# Patient Record
Sex: MALE | Race: WHITE | NOT HISPANIC OR LATINO | Employment: FULL TIME | URBAN - METROPOLITAN AREA
[De-identification: names, ages, dates, MRNs, and addresses within clinical notes are randomized per-mention and may not be internally consistent; named-entity substitution may affect disease eponyms.]

---

## 2018-01-03 ENCOUNTER — OFFICE VISIT (OUTPATIENT)
Dept: OPHTHALMOLOGY | Facility: CLINIC | Age: 38
End: 2018-01-03
Attending: OPHTHALMOLOGY
Payer: COMMERCIAL

## 2018-01-03 DIAGNOSIS — G36.0 NEUROMYELITIS OPTICA (H): ICD-10-CM

## 2018-01-03 DIAGNOSIS — H46.9 OPTIC NEURITIS, RIGHT: ICD-10-CM

## 2018-01-03 DIAGNOSIS — H53.10 SUBJECTIVE VISION DISTURBANCE: Primary | ICD-10-CM

## 2018-01-03 PROCEDURE — G0463 HOSPITAL OUTPT CLINIC VISIT: HCPCS | Mod: ZF

## 2018-01-03 PROCEDURE — 92133 CPTRZD OPH DX IMG PST SGM ON: CPT | Mod: ZF | Performed by: OPHTHALMOLOGY

## 2018-01-03 PROCEDURE — 92083 EXTENDED VISUAL FIELD XM: CPT | Mod: ZF | Performed by: OPHTHALMOLOGY

## 2018-01-03 RX ORDER — SILDENAFIL 50 MG/1
TABLET, FILM COATED ORAL
COMMUNITY
Start: 2017-05-02

## 2018-01-03 RX ORDER — AZATHIOPRINE 50 MG/1
TABLET ORAL
COMMUNITY
Start: 2017-07-24 | End: 2023-11-29

## 2018-01-03 RX ORDER — TADALAFIL 20 MG/1
20 TABLET ORAL
COMMUNITY
Start: 2016-05-27 | End: 2023-11-29

## 2018-01-03 ASSESSMENT — CUP TO DISC RATIO
OS_RATIO: 0.2
OD_RATIO: 0.2

## 2018-01-03 ASSESSMENT — EXTERNAL EXAM - RIGHT EYE: OD_EXAM: NORMAL

## 2018-01-03 ASSESSMENT — REFRACTION_WEARINGRX
OD_CYLINDER: +1.00
OS_CYLINDER: +2.25
OS_SPHERE: -3.00
OD_AXIS: 055
OD_SPHERE: -2.00
OS_AXIS: 105

## 2018-01-03 ASSESSMENT — SLIT LAMP EXAM - LIDS
COMMENTS: NORMAL
COMMENTS: NORMAL

## 2018-01-03 ASSESSMENT — CONF VISUAL FIELD
OD_NORMAL: 1
METHOD: COUNTING FINGERS
OS_NORMAL: 1

## 2018-01-03 ASSESSMENT — TONOMETRY
OD_IOP_MMHG: 8
IOP_METHOD: TONOPEN
OS_IOP_MMHG: 8

## 2018-01-03 ASSESSMENT — VISUAL ACUITY
OS_CC: 20/15
METHOD: SNELLEN - LINEAR
OD_CC: 20/15

## 2018-01-03 ASSESSMENT — EXTERNAL EXAM - LEFT EYE: OS_EXAM: NORMAL

## 2018-01-03 NOTE — PROGRESS NOTES
HPI  Compa Pennington is a 37 year old male with history of neuromyelitis optica  Status-post multiple attacks of optic  Neuritis RIGHT eye.  Last attack was November 2014.  Patient reports noticing a change in his vision about a week ago. He feels a pain in his right eye in lateral gaze that started about 2 weeks ago. He feels like there is some associated blurry vision with the pain. He feels that the eye pain has since improved. On azathioprine for NMO and bactrim for ppx. Has not had MRI in 2+ years. Has not seen neuro in 1.5 years. No autonomic signs, urinary, bowel complaints.       Assessment & Plan      (H53.10) Optic neuritis, NMO, right eye   Comment: Visual acuity at baseline, worse MD on visual field but no specific pattern. OCT retinal nerve fiber layer is atrophic as previous. Patient had pain with eye movements and reports vision changes. Concern that this is recurrence.   Plan: MRI brain/orbits. Unable to schedule MRI spine, will defer for now  Discuss if worsening, would start steroids.   Continue azathioprine.    -----------------------------------------------------------------------------------    Patient disposition:   Return in about 2 weeks (around 1/17/2018) for Follow Up with Neurop - Mian. or sooner as needed.    Teaching statement:  Complete documentation of historical and exam elements from today's encounter can be found in the full encounter summary report (not reduplicated in this progress note). I personally obtained the chief complaint(s) and history of present illness.  I confirmed and edited as necessary the review of systems, past medical/surgical history, family history, social history, and examination findings as documented by others; and I examined the patient myself. I personally reviewed the relevant tests, images, and reports as documented above.     I formulated and edited as necessary the assessment and plan and discussed the findings and management plan with the patient  and family.    Karuna Jefferson MD  Comprehensive Ophthalmology & Ocular Pathology  Department of Ophthalmology and Visual Neurosciences  [email or web address]  Pager [phone]

## 2018-01-03 NOTE — MR AVS SNAPSHOT
After Visit Summary   1/3/2018    Compa Pennington    MRN: [id number]           Patient Information     Date Of Birth          1980        Visit Information        Provider Department      1/3/2018 1:00 PM Jose Papaps MD [facility]        Today's Diagnoses     Subjective vision disturbance    -  1    Optic neuritis, right        Neuromyelitis optica (H)           Follow-ups after your visit        Follow-up notes from your care team     Return in about 6 months (around 7/3/2018) for Follow Up.      Your next 10 appointments already scheduled     Jan 04, 2018  3:00 PM CST   (Arrive by 2:45 PM)   MR BRAIN AND ORBITS with UUMR2   [facility], [place], MRI ([facility], [facility])    [address] [facility] [address] [phone]           Take your medicines as usual, unless your doctor tells you not to. Bring a list of your current medicines to your exam (including vitamins, minerals and over-the-counter drugs). Also bring the results of similar scans you may have had.  Please remove any body piercings and hair extensions before you arrive.  Follow your doctor s orders. If you do not, we may have to postpone your exam.  You will not have contrast for this exam. You do not need to do anything special to prepare.  The MRI machine uses a strong magnet. Please wear clothes without metal (snaps, zippers). A sweatsuit works well, or we may give you a hospital gown.   **IMPORTANT** THE INSTRUCTIONS BELOW ARE ONLY FOR THOSE PATIENTS WHO HAVE BEEN TOLD THEY WILL RECEIVE SEDATION OR GENERAL ANESTHESIA DURING THEIR MRI PROCEDURE:  IF YOU WILL RECEIVE SEDATION (take medicine to help you relax during your exam):   You must get the medicine from your doctor before you arrive. Bring the medicine to the exam. Do not take it at home.   Arrive one hour early. Bring someone who can take you home after the test. Your medicine will make you sleepy. After the exam, you may  not drive, take a bus or take a taxi by yourself.   No eating 8 hours before your exam. You may have clear liquids up until 4 hours before your exam. (Clear liquids include water, clear tea, black coffee and fruit juice without pulp.)  IF YOU WILL RECEIVE ANESTHESIA (be asleep for your exam):   Arrive 1 1/2 hours early. Bring someone who can take you home after the test. You may not drive, take a bus or take a taxi by yourself.   No eating 8 hours before your exam. You may have clear liquids up until 4 hours before your exam. (Clear liquids include water, clear tea, black coffee and fruit juice without pulp.)   You will spend four to five hours in the recovery room.  Please call the Imaging Department at your exam site with any questions.            Jan 11, 2018  5:30 PM CST   (Arrive by 5:15 PM)   MR CERVICAL SPINE W/O & W CONTRAST with [address] MRI ([facility])    [address] [address] [phone]           Take your medicines as usual, unless your doctor tells you not to. Bring a list of your current medicines to your exam (including vitamins, minerals and over-the-counter drugs).  You will be given intravenous contrast for this exam. To prepare:   The day before your exam, drink extra fluids at least six 8-ounce glasses (unless your doctor tells you to restrict your fluids).   Have a blood test (creatinine test) within 30 days of your exam. Go to your clinic or Diagnostic Imaging Department for this test.  The MRI machine uses a strong magnet. Please wear clothes without metal (snaps, zippers). A sweatsuit works well, or we may give you a hospital gown.  Please remove any body piercings and hair extensions before you arrive. You will also remove watches, jewelry, hairpins, wallets, dentures, partial dental plates and hearing aids. You may wear contact lenses, and you may be able to wear your rings. We have a safe place to  keep your personal items, but it is safer to leave them at home.   **IMPORTANT** THE INSTRUCTIONS BELOW ARE ONLY FOR THOSE PATIENTS WHO HAVE BEEN TOLD THEY WILL RECEIVE SEDATION OR GENERAL ANESTHESIA DURING THEIR MRI PROCEDURE:  IF YOU WILL RECEIVE SEDATION (take medicine to help you relax during your exam):   You must get the medicine from your doctor before you arrive. Bring the medicine to the exam. Do not take it at home.   Arrive one hour early. Bring someone who can take you home after the test. Your medicine will make you sleepy. After the exam, you may not drive, take a bus or take a taxi by yourself.   No eating 8 hours before your exam. You may have clear liquids up until 4 hours before your exam. (Clear liquids include water, clear tea, black coffee and fruit juice without pulp.)  IF YOU WILL RECEIVE ANESTHESIA (be asleep for your exam):   Arrive 1 1/2 hours early. Bring someone who can take you home after the test. You may not drive, take a bus or take a taxi by yourself.   No eating 8 hours before your exam. You may have clear liquids up until 4 hours before your exam. (Clear liquids include water, clear tea, black coffee and fruit juice without pulp.)  Please call the Imaging Department at your exam site with any questions.            Jan 11, 2018  6:15 PM CST   (Arrive by 6:00 PM)   MR ORBIT W/O & W CONTRAST with [address] [facility] MRI ([facility])    [address] [address] [phone]           Take your medicines as usual, unless your doctor tells you not to. Bring a list of your current medicines to your exam (including vitamins, minerals and over-the-counter drugs).  You will be given intravenous contrast for this exam. To prepare:   The day before your exam, drink extra fluids at least six 8-ounce glasses (unless your doctor tells you to restrict your fluids).   Have a blood test (creatinine test) within 30 days of your  exam. Go to your clinic or Diagnostic Imaging Department for this test.  The MRI machine uses a strong magnet. Please wear clothes without metal (snaps, zippers). A sweatsuit works well, or we may give you a hospital gown.  Please remove any body piercings and hair extensions before you arrive. You will also remove watches, jewelry, hairpins, wallets, dentures, partial dental plates and hearing aids. You may wear contact lenses, and you may be able to wear your rings. We have a safe place to keep your personal items, but it is safer to leave them at home.   **IMPORTANT** THE INSTRUCTIONS BELOW ARE ONLY FOR THOSE PATIENTS WHO HAVE BEEN TOLD THEY WILL RECEIVE SEDATION OR GENERAL ANESTHESIA DURING THEIR MRI PROCEDURE:  IF YOU WILL RECEIVE SEDATION (take medicine to help you relax during your exam):   You must get the medicine from your doctor before you arrive. Bring the medicine to the exam. Do not take it at home.   Arrive one hour early. Bring someone who can take you home after the test. Your medicine will make you sleepy. After the exam, you may not drive, take a bus or take a taxi by yourself.   No eating 8 hours before your exam. You may have clear liquids up until 4 hours before your exam. (Clear liquids include water, clear tea, black coffee and fruit juice without pulp.)  IF YOU WILL RECEIVE ANESTHESIA (be asleep for your exam):   Arrive 1 1/2 hours early. Bring someone who can take you home after the test. You may not drive, take a bus or take a taxi by yourself.   No eating 8 hours before your exam. You may have clear liquids up until 4 hours before your exam. (Clear liquids include water, clear tea, black coffee and fruit juice without pulp.)  Please call the Imaging Department at your exam site with any questions.            Jan 11, 2018  7:00 PM CST   (Arrive by 6:45 PM)   MR BRAIN W/O & W CONTRAST with [address] [facility] ([facility])    [address]  [facility] [address] [phone]           Take your medicines as usual, unless your doctor tells you not to. Bring a list of your current medicines to your exam (including vitamins, minerals and over-the-counter drugs).  You will be given intravenous contrast for this exam. To prepare:   The day before your exam, drink extra fluids at least six 8-ounce glasses (unless your doctor tells you to restrict your fluids).   Have a blood test (creatinine test) within 30 days of your exam. Go to your clinic or Diagnostic Imaging Department for this test.  The MRI machine uses a strong magnet. Please wear clothes without metal (snaps, zippers). A sweatsuit works well, or we may give you a hospital gown.  Please remove any body piercings and hair extensions before you arrive. You will also remove watches, jewelry, hairpins, wallets, dentures, partial dental plates and hearing aids. You may wear contact lenses, and you may be able to wear your rings. We have a safe place to keep your personal items, but it is safer to leave them at home.   **IMPORTANT** THE INSTRUCTIONS BELOW ARE ONLY FOR THOSE PATIENTS WHO HAVE BEEN TOLD THEY WILL RECEIVE SEDATION OR GENERAL ANESTHESIA DURING THEIR MRI PROCEDURE:  IF YOU WILL RECEIVE SEDATION (take medicine to help you relax during your exam):   You must get the medicine from your doctor before you arrive. Bring the medicine to the exam. Do not take it at home.   Arrive one hour early. Bring someone who can take you home after the test. Your medicine will make you sleepy. After the exam, you may not drive, take a bus or take a taxi by yourself.   No eating 8 hours before your exam. You may have clear liquids up until 4 hours before your exam. (Clear liquids include water, clear tea, black coffee and fruit juice without pulp.)  IF YOU WILL RECEIVE ANESTHESIA (be asleep for your exam):   Arrive 1 1/2 hours early. Bring someone who can take you home after the test. You may not  drive, take a bus or take a taxi by yourself.   No eating 8 hours before your exam. You may have clear liquids up until 4 hours before your exam. (Clear liquids include water, clear tea, black coffee and fruit juice without pulp.)  Please call the Imaging Department at your exam site with any questions.              Future tests that were ordered for you today     Open Future Orders        Priority Expected Expires Ordered    MR Brain and Orbits Routine  1/3/2019 1/3/2018            Who to contact     Please call your clinic at [phone] to:    Ask questions about your health    Make or cancel appointments    Discuss your medicines    Learn about your test results    Speak to your doctor   If you have compliments or concerns about an experience at your clinic, or if you wish to file a complaint, please contact [facility] Patient Relations at [phone] or email us at [email or web address]         Additional Information About Your Visit        A V.E[employer]T.S[employer]c.a.r.e[employer]har[employer] Television Information     [employer] is an electronic gateway that provides easy, online access to your medical records. With [employer], you can request a clinic appointment, read your test results, renew a prescription or communicate with your care team.     To sign up for [employer] visit the website at [email or web address]   You will be asked to enter the access code listed below, as well as some personal information. Please follow the directions to create your username and password.     Your access code is: [email or web address]  Expires: 3/29/2018  6:31 AM     Your access code will  in 90 days. If you need help or a new code, please contact your [facility] or call [phone] for assistance.        Care EveryWhere ID     This is your Care EveryWhere ID. This could be used by other organizations to access your [place] medical records  [id number]         Blood Pressure from Last 3 Encounters:   14  (!) 148/105   11/29/14 (!) 144/106   11/28/14 135/83    Weight from Last 3 Encounters:   11/28/14 96.6 kg (213 lb)              We Performed the Following     Glaucoma Top OU     OCT Optic Nerve RNFL Spectralis OU (both eyes)        Primary Care Provider Office Phone # Fax #    Mayra Barclay [phone]       XX RESIGNED XX  [place] [address]        Equal Access to Services     [facility] : Hadii aad ku hadasho Soomaali, waaxda luqadaha, qaybta kaalmada adeegyada, waxay idiin hayaan adeeg khlouiscisco la'elsin . So [facility] [phone].    ATENCIÓN: Si cecila roderick, tiene a mckeon disposición servicios gratuitos de asistencia lingüística. [facility] [phone].    We comply with applicable federal civil rights laws and [place] laws. We do not discriminate on the basis of race, color, national origin, age, disability, sex, sexual orientation, or gender identity.            Thank you!     Thank you for choosing [facility]  for your care. Our goal is always to provide you with excellent care. Hearing back from our patients is one way we can continue to improve our services. Please take a few minutes to complete the written survey that you may receive in the mail after your visit with us. Thank you!             Your Updated Medication List - Protect others around you: Learn how to safely use, store and throw away your medicines at www.disposemymeds.org.          This list is accurate as of: 1/3/18  4:22 PM.  Always use your most recent med list.                   Brand Name Dispense Instructions for use Diagnosis    * AZATHIOPRINE PO      Take 50 mg by mouth 2 times daily 150 mg in the am, 100 mg in the PM.        * azaTHIOprine 50 MG tablet    IMURAN          CIALIS 20 MG tablet   Generic drug:  tadalafil      Take 20 mg by mouth        OXYBUTYNIN CHLORIDE ER PO      Take by mouth daily        sildenafil 50 MG tablet    VIAGRA     Use 1/2 - 2 tablets ( MG) at least 30 minutes prior to intercourse.         SULFAMETHOXAZOLE-TRIMETHOPRIM PO      Take by mouth daily Standard amount?        vitamin D3 1000 UNITS Caps      Take 1,000 Units by mouth        * Notice:  This list has 2 medication(s) that are the same as other medications prescribed for you. Read the directions carefully, and ask your doctor or other care provider to review them with you.

## 2018-01-03 NOTE — NURSING NOTE
Chief Complaints and History of Present Illnesses   Patient presents with     Consult For     Possible RE vision changes     HPI    Last Eye Exam:  6/30/15   Additional Referring Providers:  Dr Cesario Pappas   Affected eye(s):  Both   Symptoms:        Unknown duration    Frequency:  Constant       Do you have eye pain now?:  No      Comments:  Compa is here today for a follow up of vision change Re. He was last her in June of 2015 and saw Dr Cesario Pappas.  He was told by Dr Pappas if he noticed any vision change he should bee seen ASAP.  He has a history of Optic neuritis.   Compa says two weeks ago he had some pain RE when he moved his eyes in anything but primary position. Around last Thursday, he felt his right eye was not seeing as well as his left, and made an appointment here. He also says he has been fighting a cold for the past two weeks.     Luis Miguel Barba COT 1:20 PM January 3, 2018

## 2018-01-04 ENCOUNTER — HOSPITAL ENCOUNTER (OUTPATIENT)
Dept: MRI IMAGING | Facility: CLINIC | Age: 38
Discharge: HOME | End: 2018-01-04
Attending: OPHTHALMOLOGY | Admitting: OPHTHALMOLOGY
Payer: COMMERCIAL

## 2018-01-04 DIAGNOSIS — H53.10 SUBJECTIVE VISION DISTURBANCE: ICD-10-CM

## 2018-01-04 DIAGNOSIS — H46.9 OPTIC NEURITIS, RIGHT: ICD-10-CM

## 2018-01-04 DIAGNOSIS — G36.0 NEUROMYELITIS OPTICA (H): ICD-10-CM

## 2018-01-04 PROCEDURE — 25000128 H RX IP 250 OP 636: Performed by: STUDENT IN AN ORGANIZED HEALTH CARE EDUCATION/TRAINING PROGRAM

## 2018-01-04 PROCEDURE — 70553 MRI BRAIN STEM W/O & W/DYE: CPT

## 2018-01-04 PROCEDURE — A9585 GADOBUTROL INJECTION: HCPCS | Performed by: STUDENT IN AN ORGANIZED HEALTH CARE EDUCATION/TRAINING PROGRAM

## 2018-01-04 RX ORDER — GADOBUTROL 604.72 MG/ML
10 INJECTION INTRAVENOUS ONCE
Status: COMPLETED | OUTPATIENT
Start: 2018-01-04 | End: 2018-01-04

## 2018-01-04 RX ADMIN — GADOBUTROL 10 ML: 604.72 INJECTION INTRAVENOUS at 15:26

## 2018-01-05 ENCOUNTER — TELEPHONE (OUTPATIENT)
Dept: OPHTHALMOLOGY | Facility: CLINIC | Age: 38
End: 2018-01-05

## 2018-01-05 NOTE — TELEPHONE ENCOUNTER
MRI results in  Noted optic neuritis right eye   Dr. Pappas not in this Friday afternoon  Will forward to resident on call to review MRI with pt and f/u plan of care    Kadeem Tapia RN 3:20 PM 01/05/18

## 2018-01-05 NOTE — TELEPHONE ENCOUNTER
----- Message from Kishor Gautam sent at 1/5/2018  3:37 PM CST -----  Regarding: Pt Called Requesting Results of MRI, Very Concerned.   Contact: [phone]  Pt of Dr. Pappas called stating he recently had an MRI conducted to to see if he had Inflammation of his Optic Nerve, and stated he was advised if it came back positive that he was to begin treatment for it immediately, and if that is the case would want to begin treatment this coming Monday.    Pt requests a call back as soon as possible, and requests the call back before the weekend, stating that he felt it would be unfortunate if he lost, or had vision decrease over the weekend due to lack of communication.    Please contact pt back at [phone] to discuss results, and next steps.     Thank you,  Aaron  Call Center    Please DO NOT send message and or reply back to sender. Call center Representatives DO NOT respond to Messages.

## 2018-01-05 NOTE — TELEPHONE ENCOUNTER
----- Message from Eric Lainez sent at 1/5/2018  1:00 PM CST -----  Regarding: Result from MRI  Contact: [phone]  Pt is requesting to receive a call back to discuss the results of his MRI    Please call Pt back at [phone]    Thank you!  CH  Please DO NOT send this message and/or reply back to sender. Call Center Representatives DO NOT respond to messages.

## 2018-01-05 NOTE — TELEPHONE ENCOUNTER
Pt called second time today    Reviewed Dr. Pappas not in office this afternoon-- message was forwarded to him    Also reviewed sent message to our resident on call to call back-- in grand rounds this afternoon    Reviewed with pt should get call back about plan of care this afternoon yet    Kadeem Tapia RN 3:48 PM 01/05/18

## 2018-01-06 ENCOUNTER — OFFICE VISIT (OUTPATIENT)
Dept: OPHTHALMOLOGY | Facility: CLINIC | Age: 38
End: 2018-01-06
Attending: OPHTHALMOLOGY
Payer: COMMERCIAL

## 2018-01-06 ENCOUNTER — HOSPITAL ENCOUNTER (EMERGENCY)
Facility: CLINIC | Age: 38
Discharge: HOME | End: 2018-01-06
Attending: EMERGENCY MEDICINE | Admitting: EMERGENCY MEDICINE
Payer: COMMERCIAL

## 2018-01-06 VITALS
HEART RATE: 55 BPM | DIASTOLIC BLOOD PRESSURE: 74 MMHG | OXYGEN SATURATION: 99 % | WEIGHT: 200 LBS | SYSTOLIC BLOOD PRESSURE: 117 MMHG | RESPIRATION RATE: 16 BRPM | TEMPERATURE: 97.7 F

## 2018-01-06 DIAGNOSIS — H46.9 OPTIC NEURITIS, RIGHT: ICD-10-CM

## 2018-01-06 DIAGNOSIS — Z86.69 HISTORY OF CORNEAL ULCER: Primary | ICD-10-CM

## 2018-01-06 PROCEDURE — 99284 EMERGENCY DEPT VISIT MOD MDM: CPT | Mod: 25 | Performed by: EMERGENCY MEDICINE

## 2018-01-06 PROCEDURE — 25000128 H RX IP 250 OP 636: Performed by: EMERGENCY MEDICINE

## 2018-01-06 PROCEDURE — 99284 EMERGENCY DEPT VISIT MOD MDM: CPT | Mod: Z6 | Performed by: EMERGENCY MEDICINE

## 2018-01-06 PROCEDURE — 96365 THER/PROPH/DIAG IV INF INIT: CPT | Performed by: EMERGENCY MEDICINE

## 2018-01-06 RX ORDER — PREDNISOLONE ACETATE 10 MG/ML
1 SUSPENSION/ DROPS OPHTHALMIC 3 TIMES DAILY
Qty: 1 BOTTLE | Refills: 0 | Status: SHIPPED | OUTPATIENT
Start: 2018-01-06 | End: 2018-01-06

## 2018-01-06 RX ORDER — METHYLPREDNISOLONE SODIUM SUCCINATE 125 MG/2ML
1000 INJECTION, POWDER, LYOPHILIZED, FOR SOLUTION INTRAMUSCULAR; INTRAVENOUS ONCE
Status: DISCONTINUED | OUTPATIENT
Start: 2018-01-06 | End: 2018-01-06

## 2018-01-06 RX ADMIN — SODIUM CHLORIDE 1000 MG: 900 INJECTION, SOLUTION INTRAVENOUS at 15:40

## 2018-01-06 ASSESSMENT — VISUAL ACUITY
OS: 20/20
METHOD: SNELLEN - LINEAR
OD: 20/25
OD_CC: 20/15
OS_CC: 20/15

## 2018-01-06 ASSESSMENT — EXTERNAL EXAM - LEFT EYE: OS_EXAM: NORMAL

## 2018-01-06 ASSESSMENT — SLIT LAMP EXAM - LIDS
COMMENTS: NORMAL
COMMENTS: NORMAL

## 2018-01-06 ASSESSMENT — CUP TO DISC RATIO
OD_RATIO: 0.3
OS_RATIO: 0.2

## 2018-01-06 ASSESSMENT — EXTERNAL EXAM - RIGHT EYE: OD_EXAM: NORMAL

## 2018-01-06 ASSESSMENT — ENCOUNTER SYMPTOMS
VOMITING: 0
FEVER: 0
NAUSEA: 0

## 2018-01-06 NOTE — ED AVS SNAPSHOT
[facility], [place], Emergency Department    [address] [facility] [address]    Phone:  [phone]                                       Compa Pennington   MRN: [id number]    Department:  [facility], Emergency Department   Date of Visit:  1/6/2018           After Visit Summary Signature Page     I have received my discharge instructions, and my questions have been answered. I have discussed any challenges I see with this plan with the nurse or doctor.    ..........................................................................................................................................  Patient/Patient Representative Signature      ..........................................................................................................................................  Patient Representative Print Name and Relationship to Patient    ..................................................               ................................................  Date                                            Time    ..........................................................................................................................................  Reviewed by Signature/Title    ...................................................              ..............................................  Date                                                            Time

## 2018-01-06 NOTE — DISCHARGE INSTRUCTIONS
Please make an appointment to follow up with [facility] Clinic (phone: [phone]) as soon as possible to schedule additional steroid infusions.  If you have any worsening symptoms, return to the emergency department for re-evaluation.

## 2018-01-06 NOTE — MR AVS SNAPSHOT
After Visit Summary   2018    Compa Pennington    MRN: [id number]           Patient Information     Date Of Birth          1980        Visit Information        Provider Department      2018 2:30 PM Esperanza Bergeron MD [facility]        Today's Diagnoses     History of corneal ulcer    -  1    Optic neuritis, right           Follow-ups after your visit        Your next 10 appointments already scheduled     2018  2:30 PM CDT   RETURN NEURO with Cesario Pappas MD   Eye Clinic ([facility])    [address]  [address]  [address]  [facility] [address]   [phone]              Who to contact     Please call your clinic at [phone] to:    Ask questions about your health    Make or cancel appointments    Discuss your medicines    Learn about your test results    Speak to your doctor   If you have compliments or concerns about an experience at your clinic, or if you wish to file a complaint, please contact [facility] Patient Relations at [phone] or email us at [email or web address]         Additional Information About Your Visit        MyChart Information     Innography is an electronic gateway that provides easy, online access to your medical records. With Innography, you can request a clinic appointment, read your test results, renew a prescription or communicate with your care team.     To sign up for Innography visit the website at [email or web address]   You will be asked to enter the access code listed below, as well as some personal information. Please follow the directions to create your username and password.     Your access code is: [email or web address]  Expires: 3/29/2018  6:31 AM     Your access code will  in 90 days. If you need help or a new code, please contact your [facility] or call [phone] for assistance.        Care EveryWhere ID     This is your Care  EveryWhere ID. This could be used by other organizations to access your [place] medical records  [id number]         Blood Pressure from Last 3 Encounters:   01/11/18 131/74   01/10/18 118/70   01/09/18 119/66    Weight from Last 3 Encounters:   01/06/18 90.7 kg (200 lb)   11/28/14 96.6 kg (213 lb)              Today, you had the following     No orders found for display       Primary Care Provider Office Phone # Fax #    Isaiah Flower [phone]       [facility] [address]  [facility] [address]        Equal Access to Services     [facility] : Hadii aad ku hadasho Soomaali, waaxda luqadaha, qaybta kaalmada adeegyada, luis e sarmiento hayelsin dain garibay . So [facility] [phone].    ATENCIÓN: Si habla español, tiene a mckeon disposición servicios gratuitos de asistencia lingüística. [facility] [phone].    We comply with applicable federal civil rights laws and [place] laws. We do not discriminate on the basis of race, color, national origin, age, disability, sex, sexual orientation, or gender identity.            Thank you!     Thank you for choosing EYE CLINIC  for your care. Our goal is always to provide you with excellent care. Hearing back from our patients is one way we can continue to improve our services. Please take a few minutes to complete the written survey that you may receive in the mail after your visit with us. Thank you!             Your Updated Medication List - Protect others around you: Learn how to safely use, store and throw away your medicines at www.disposemymeds.org.          This list is accurate as of: 1/6/18 11:59 PM.  Always use your most recent med list.                   Brand Name Dispense Instructions for use Diagnosis    * AZATHIOPRINE PO      Take 50 mg by mouth 2 times daily 150 mg in the am, 100 mg in the PM.        * azaTHIOprine 50 MG tablet    IMURAN          CIALIS 20 MG tablet   Generic drug:  tadalafil      Take 20 mg by mouth         OXYBUTYNIN CHLORIDE ER PO      Take by mouth daily        sildenafil 50 MG tablet    VIAGRA     Use 1/2 - 2 tablets ( MG) at least 30 minutes prior to intercourse.        SULFAMETHOXAZOLE-TRIMETHOPRIM PO      Take by mouth daily Standard amount?        vitamin D3 1000 UNITS Caps      Take 1,000 Units by mouth        * Notice:  This list has 2 medication(s) that are the same as other medications prescribed for you. Read the directions carefully, and ask your doctor or other care provider to review them with you.

## 2018-01-06 NOTE — ED PROVIDER NOTES
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  Patient reports that he started experiencing cloudiness in the vision of his right eye about 1 week ago.  He did undergo an MRI of the brain and orbits on 1/4/2018 (2 days ago) which did reveal right optic neuritis.  Patient was contacted regarding these results and referred for further evaluation and treatment.  He was seen in ophthalmology clinic who recommended IV Solu-Medrol and referred the patient to the ED for infusion.  Patient reports that he is not currently on steroids.  He does take azathioprine for NMO and denies recent dose changes.  He is also on Bactrim for prophylactic.  He denies any other symptoms currently including no nausea vomiting or fever.    No past medical history on file.    No past surgical history on file.    Family History   Problem Relation Age of Onset     CANCER No family hx of      DIABETES No family hx of      Hypertension No family hx of      CEREBROVASCULAR DISEASE No family hx of      Thyroid Disease No family hx of      Glaucoma No family hx of      Macular Degeneration No family hx of        Social History   Substance Use Topics     Smoking status: Never Smoker     Smokeless tobacco: Not on file     Alcohol use Not on file     Current Facility-Administered Medications   Medication     methylPREDNISolone sodium succinate (solu-MEDROL) 1,000 mg in NaCl 0.9 % 250 mL intermittent infusion     Current Outpatient Prescriptions   Medication     Cholecalciferol (VITAMIN D3) 1000 UNITS CAPS     sildenafil (VIAGRA) 50 MG tablet     tadalafil (CIALIS) 20 MG tablet     azaTHIOprine (IMURAN) 50 MG tablet     AZATHIOPRINE PO     SULFAMETHOXAZOLE-TRIMETHOPRIM PO     OXYBUTYNIN CHLORIDE ER PO      No Known Allergies     I have reviewed the Medications,  Allergies, Past Medical and Surgical History, and Social History in the Epic system.    Review of Systems   Constitutional: Negative for fever.   Eyes: Positive for visual disturbance (right, cloudiness).   Gastrointestinal: Negative for nausea and vomiting.   All other systems reviewed and are negative.      Physical Exam   BP: 120/81  Pulse: 75  Temp:  (need to recheck after patient warms up )  Resp: 14  Weight: 90.7 kg (200 lb)  SpO2: 100 %  Visual Acuity-Left: 20/20  Visual Acuity-Right: 20/25      Physical Exam  General: patient is alert and oriented and in no acute distress   Head: atraumatic and normocephalic   EENT: moist mucus membranes, pupils equal round and reactive, sclera anicteric  Neck: supple   Cardiovascular: regular rate and rhythm, extremities warm and well perfused, no lower extremity edema  Pulmonary: lungs clear to auscultation bilaterally   Abdomen: soft, non-tender   Musculoskeletal: normal range of motion   Neurological: alert and oriented, moving all extremities symmetrically, gait normal   Skin: warm, dry     ED Course     ED Course     Procedures     2:42 PM  The patient was seen and examined by Dr. Marti in Room HWB.               Critical Care time:  none             Labs Ordered and Resulted from Time of ED Arrival Up to the Time of Departure from the ED - No data to display         Assessments & Plan (with Medical Decision Making)   Mr. 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  It has been recommended that he receive 1 g IV Solu-Medrol for symptoms.  He denies any other acute neurologic symptoms, infectious symptoms or other concerns at this time.  He will plan to follow-up with ophthalmology on Monday for additional infusions of steroids in the infusion center.  He was given close return instructions for the ED and voiced understanding.    I have  reviewed the nursing notes.    I have reviewed the findings, diagnosis, plan and need for follow up with the patient.    Discharge Medication List as of 1/6/2018  4:52 PM          Final diagnoses:   Optic neuritis, right     IBrook, am serving as a trained medical scribe to document services personally performed by Shea Marti MD, based on the provider's statements to me.   I, Shea Marti MD, was physically present and have reviewed and verified the accuracy of this note documented by Brook Ferraro.     1/6/2018   [facility], [place], EMERGENCY DEPARTMENT     Shea Marti MD  01/06/18 [id number]

## 2018-01-06 NOTE — PROGRESS NOTES
" HPI  Compa Pennington is a 37 year old male with history of neuromyelitis optica  Status-post multiple attacks of optic  Neuritis RIGHT eye.  Last attack was November 2014.  Patient reports noticing a change in his vision about 2 weeks ago. He reports intermittent pain with eye movements in his right eye for the last month or so, but more recently began to notice subjective visual decline. He reports \"its like looking through a cloud\". Patient reports he awoke this AM and noted vision has declined further. He finds colors more difficult to ascertain with his right eye. Currently he is receiving azathioprine for NMO and bactrim for prophylaxsis.He was lase seen by neurophthalmology in 2014.  No autonomic signs, urinary, bowel complaints.     Ocular history:  Recurrent optic neuritis right eye  Last attack was November 2014.    NMO    MRI brain and orbits 01/04/18 :  There is new T2 hyperintensity in the intraorbital right  optic nerve associated with intense contrast enhancement. The  prechiasmatic optic nerves are contacted by a mildly distorted by the  adjacent internal carotid arteries. Optic tracts appear within normal  limits.     No acute intracranial hemorrhage. No hydrocephalus. No focal mass.     There is bilateral ethmoid mucosal thickening, right greater than  left, which involves the frontoethmoid recesses bilaterally. Mild  mucosal thickening in the frontal sinus.     Assessment & Plan      (H53.10) Optic neuritis, NMO, right eye   Comment: findings consistent with early recurrent NMO associated optic neuritis. Visual acuity at baseline, however given findings on patient MRI consistent with diagnosis and patient subjective worsening of vision. Will start steroid therapy.  OCT retinal nerve fiber layer is atrophic as previous.     Plan:  Continue azathioprine.  Will start first dose of IV solumedrol, 1 gram, to be given in ED today. Will follow up with patient on Monday to set up outpt vs home infusions " with a total of 5 to be given over the next week.     Patient contact number:[phone]    Esperanza Bergeron MD  Ophthalmology PGY3     Patient discussed with Dr. Rose    Attending:  Covering neuro-ophthalmology call I discussed this patient with Dr. Bergeron and have since reviewed her documentation.  I agree with her documentation and assessment and plan.    Patient will follow-up with Dr. Pappas in outpatient neuro-ophthalmology clinic.      Mil oRse MD  , Neuro-Ophthalmology and Adult Strabismus  Department of Ophthalmology and Visual Neurosciences  [facility]

## 2018-01-06 NOTE — ED AVS SNAPSHOT
[facility], Emergency Department    [address] [address]    Phone:  [phone]                                       Compa Pennington   MRN: [id number]    Department:  [facility], Emergency Department   Date of Visit:  1/6/2018           Patient Information     Date Of Birth          1980        Your diagnoses for this visit were:     Optic neuritis, right        You were seen by Shea Marti MD.        Discharge Instructions       Please make an appointment to follow up with Eye Clinic (phone: [phone]) as soon as possible to schedule additional steroid infusions.  If you have any worsening symptoms, return to the emergency department for re-evaluation.            Future Appointments        Provider Department Dept Phone Center    1/11/2018 5:30 PM [facility] MRI ROOM [address] [place] [address] [phone] [facility]    1/11/2018 6:15 PM [facility] MRI ROOM [address] [place] [address] [phone] [facility]    1/11/2018 7:00 PM [facility] ROOM [address] [place] [address] [phone] [facility]      24 Hour Appointment Hotline       To make an appointment at any [facility], call [phone] ([phone]). If you don't have a family doctor or clinic, we will help you find one. [place] clinics are conveniently located to serve the needs of you and your family.             Review of your medicines      Our records show that you are taking the medicines listed below. If these are incorrect, please call your family doctor or clinic.        Dose / Directions Last dose taken    * AZATHIOPRINE PO   Dose:  50 mg        Take 50 mg by mouth 2 times daily 150 mg in the am, 100 mg in the PM.   Refills:  0        * azaTHIOprine 50 MG tablet   Commonly known as:  IMURAN        Refills:  0        CIALIS 20 MG tablet   Dose:  20 mg   Generic drug:  tadalafil        Take 20 mg by mouth   Refills:  0        OXYBUTYNIN CHLORIDE ER PO         "Take by mouth daily   Refills:  0        sildenafil 50 MG tablet   Commonly known as:  VIAGRA        Use 1/2 - 2 tablets ( MG) at least 30 minutes prior to intercourse.   Refills:  0        SULFAMETHOXAZOLE-TRIMETHOPRIM PO        Take by mouth daily Standard amount?   Refills:  0        vitamin D3 1000 UNITS Caps   Dose:  1000 Units        Take 1,000 Units by mouth   Refills:  0        * Notice:  This list has 2 medication(s) that are the same as other medications prescribed for you. Read the directions carefully, and ask your doctor or other care provider to review them with you.            Orders Needing Specimen Collection     None      Pending Results     No orders found from 1/4/2018 to 1/7/2018.            Pending Culture Results     No orders found from 1/4/2018 to 1/7/2018.            Pending Results Instructions     If you had any lab results that were not finalized at the time of your Discharge, you can call the ED Lab Result RN at [phone]. You will be contacted by this team for any positive Lab results or changes in treatment. The nurses are available 7 days a week from 10A to 6:30P.  You can leave a message 24 hours per day and they will return your call.        Thank you for choosing [facility]       Thank you for choosing [facility] for your care. Our goal is always to provide you with excellent care. Hearing back from our patients is one way we can continue to improve our services. Please take a few minutes to complete the written survey that you may receive in the mail after you visit with us. Thank you!        [email or web address] Information     [employer] lets you send messages to your doctor, view your test results, renew your prescriptions, schedule appointments and more. To sign up, go to [email or web address] . Click on \"Log in\" on the left side of the screen, which will take you to the Welcome page. Then click on \"Sign up Now\" on the right side of the page.     You will be asked to enter the access " code listed below, as well as some personal information. Please follow the directions to create your username and password.     Your access code is: [email or web address]  Expires: 3/29/2018  6:31 AM     Your access code will  in 90 days. If you need help or a new code, please call your [place] clinic or [phone].        Care EveryWhere ID     This is your Care EveryWhere ID. This could be used by other organizations to access your [place] medical records  [id number]        Equal Access to Services     [facility] : Lupe Rueda, mili nuñez, ole newsomealsandoval bustos, luis e garibay . So [facility] [phone].    ATENCIÓN: Si habla español, tiene a mckeon disposición servicios gratuitos de asistencia lingüística. Llame al [phone].    We comply with applicable federal civil rights laws and [place] laws. We do not discriminate on the basis of race, color, national origin, age, disability, sex, sexual orientation, or gender identity.            After Visit Summary       This is your record. Keep this with you and show to your community pharmacist(s) and doctor(s) at your next visit.

## 2018-01-07 DIAGNOSIS — H46.9 OPTIC NEURITIS, RIGHT: Primary | ICD-10-CM

## 2018-01-08 ENCOUNTER — INFUSION THERAPY VISIT (OUTPATIENT)
Dept: INFUSION THERAPY | Facility: CLINIC | Age: 38
End: 2018-01-08
Attending: OPHTHALMOLOGY
Payer: COMMERCIAL

## 2018-01-08 ENCOUNTER — TELEPHONE (OUTPATIENT)
Dept: OPHTHALMOLOGY | Facility: CLINIC | Age: 38
End: 2018-01-08

## 2018-01-08 VITALS
SYSTOLIC BLOOD PRESSURE: 117 MMHG | DIASTOLIC BLOOD PRESSURE: 73 MMHG | HEART RATE: 57 BPM | RESPIRATION RATE: 16 BRPM | TEMPERATURE: 97.5 F

## 2018-01-08 DIAGNOSIS — H46.9 RECURRENT OPTIC NEURITIS: Primary | ICD-10-CM

## 2018-01-08 PROCEDURE — 96365 THER/PROPH/DIAG IV INF INIT: CPT

## 2018-01-08 PROCEDURE — 25000128 H RX IP 250 OP 636: Mod: ZF | Performed by: OPHTHALMOLOGY

## 2018-01-08 RX ADMIN — SODIUM CHLORIDE 1000 MG: 9 INJECTION, SOLUTION INTRAVENOUS at 14:34

## 2018-01-08 NOTE — TELEPHONE ENCOUNTER
Pt seen over the weekend in the ED for vision changes.    One dose of solumedrol given in the ED and an additional 4 doses needed (5 total) this week. Order from Dr Bergeron and Dr Rose.    Will arrange at-home infusion care for remaining 4 doses.    ADDEND: pt refused the at-home treatment but is scheduled at the [facility]. Pt also requesting orders be sent to [facility]    Addy Willis, COA  Neuro Ophthalmology Facilitator

## 2018-01-08 NOTE — MR AVS SNAPSHOT
After Visit Summary   1/8/2018    Compa Pennington    MRN: [id number]           Patient Information     Date Of Birth          1980        Visit Information        Provider Department      1/8/2018 2:00 PM UC 50 ATC; UC SPEC INFUSION [facility] Procedure        Today's Diagnoses     Recurrent optic neuritis    -  1      Care Instructions    Dear Compa Pennington    Thank you for choosing [facility] ([facility]) for your infusion.  The following information is a summary of our appointment as well as important reminders.      We look forward in seeing you on your next appointment here at [facility].  Please don t hesitate to call us at [phone] to reschedule any of your appointments or to speak with one of the [facility] registered nurses.  It was a pleasure taking care of you today.    Sincerely,    [facility]  Specialty Infusion [facility] Procedure Center  [address] [place] [address]  [address]  Phone:  [phone]            Follow-ups after your visit        Your next 10 appointments already scheduled     Jan 09, 2018  2:00 PM CST   Infusion 120 with UC SPEC INFUSION, UC 46 ATC   [facility] Specialty and Procedure ([facility])    [address]  [address]  [facility] [address] [phone]            Kirill 10, 2018  2:00 PM CST   Infusion 120 with UC SPEC INFUSION, UC 50 ATC   [facility] Special[facility] Procedure ([facility])    [address]  [address]  [facility] [address] [phone]            Jan 11, 2018  3:00 PM CST   Infusion 120 with UC SPEC INFUSION, UC 49 ATC   [facility] Specialty [facility] Procedure ([facility])    [address]  [address]  [facility] [address] [phone]            Jan 11, 2018   5:30 PM CST   (Arrive by 5:15 PM)   MR CERVICAL SPINE W/O & W CONTRAST with UCMR1   [facility] MRI ([facility] [place])    [address] [facility]  [address] [employer] [address] [phone]           Take your medicines as usual, unless your doctor tells you not to. Bring a list of your current medicines to your exam (including vitamins, minerals and over-the-counter drugs).  You will be given intravenous contrast for this exam. To prepare:   The day before your exam, drink extra fluids at least six 8-ounce glasses (unless your doctor tells you to restrict your fluids).   Have a blood test (creatinine test) within 30 days of your exam. Go to your clinic or Diagnostic Imaging Department for this test.  The MRI machine uses a strong magnet. Please wear clothes without metal (snaps, zippers). A sweatsuit works well, or we may give you a hospital gown.  Please remove any body piercings and hair extensions before you arrive. You will also remove watches, jewelry, hairpins, wallets, dentures, partial dental plates and hearing aids. You may wear contact lenses, and you may be able to wear your rings. We have a safe place to keep your personal items, but it is safer to leave them at home.   **IMPORTANT** THE INSTRUCTIONS BELOW ARE ONLY FOR THOSE PATIENTS WHO HAVE BEEN TOLD THEY WILL RECEIVE SEDATION OR GENERAL ANESTHESIA DURING THEIR MRI PROCEDURE:  IF YOU WILL RECEIVE SEDATION (take medicine to help you relax during your exam):   You must get the medicine from your doctor before you arrive. Bring the medicine to the exam. Do not take it at home.   Arrive one hour early. Bring someone who can take you home after the test. Your medicine will make you sleepy. After the exam, you may not drive, take a bus or take a taxi by yourself.   No eating 8 hours before your exam. You may have clear liquids up until 4 hours before your exam. (Clear liquids include water, clear tea, black coffee and  fruit juice without pulp.)  IF YOU WILL RECEIVE ANESTHESIA (be asleep for your exam):   Arrive 1 1/2 hours early. Bring someone who can take you home after the test. You may not drive, take a bus or take a taxi by yourself.   No eating 8 hours before your exam. You may have clear liquids up until 4 hours before your exam. (Clear liquids include water, clear tea, black coffee and fruit juice without pulp.)  Please call the Imaging Department at your exam site with any questions.            Jan 11, 2018  6:15 PM CST   (Arrive by 6:00 PM)   MR ORBIT W/O & W CONTRAST with [address] MRI ([facility])    [address] [facility]  1st Floor  [facility] [address] [phone]           Take your medicines as usual, unless your doctor tells you not to. Bring a list of your current medicines to your exam (including vitamins, minerals and over-the-counter drugs).  You will be given intravenous contrast for this exam. To prepare:   The day before your exam, drink extra fluids at least six 8-ounce glasses (unless your doctor tells you to restrict your fluids).   Have a blood test (creatinine test) within 30 days of your exam. Go to your clinic or Diagnostic Imaging Department for this test.  The MRI machine uses a strong magnet. Please wear clothes without metal (snaps, zippers). A sweatsuit works well, or we may give you a hospital gown.  Please remove any body piercings and hair extensions before you arrive. You will also remove watches, jewelry, hairpins, wallets, dentures, partial dental plates and hearing aids. You may wear contact lenses, and you may be able to wear your rings. We have a safe place to keep your personal items, but it is safer to leave them at home.   **IMPORTANT** THE INSTRUCTIONS BELOW ARE ONLY FOR THOSE PATIENTS WHO HAVE BEEN TOLD THEY WILL RECEIVE SEDATION OR GENERAL ANESTHESIA DURING THEIR MRI PROCEDURE:  IF YOU WILL RECEIVE SEDATION (take medicine to  help you relax during your exam):   You must get the medicine from your doctor before you arrive. Bring the medicine to the exam. Do not take it at home.   Arrive one hour early. Bring someone who can take you home after the test. Your medicine will make you sleepy. After the exam, you may not drive, take a bus or take a taxi by yourself.   No eating 8 hours before your exam. You may have clear liquids up until 4 hours before your exam. (Clear liquids include water, clear tea, black coffee and fruit juice without pulp.)  IF YOU WILL RECEIVE ANESTHESIA (be asleep for your exam):   Arrive 1 1/2 hours early. Bring someone who can take you home after the test. You may not drive, take a bus or take a taxi by yourself.   No eating 8 hours before your exam. You may have clear liquids up until 4 hours before your exam. (Clear liquids include water, clear tea, black coffee and fruit juice without pulp.)  Please call the Imaging Department at your exam site with any questions.            Jan 11, 2018  7:00 PM CST   (Arrive by 6:45 PM)   MR BRAIN W/O & W CONTRAST with [address] MRI ([facility])    [address] [address] [phone]           Take your medicines as usual, unless your doctor tells you not to. Bring a list of your current medicines to your exam (including vitamins, minerals and over-the-counter drugs).  You will be given intravenous contrast for this exam. To prepare:   The day before your exam, drink extra fluids at least six 8-ounce glasses (unless your doctor tells you to restrict your fluids).   Have a blood test (creatinine test) within 30 days of your exam. Go to your clinic or Diagnostic Imaging Department for this test.  The MRI machine uses a strong magnet. Please wear clothes without metal (snaps, zippers). A sweatsuit works well, or we may give you a hospital gown.  Please remove any body piercings and hair extensions  before you arrive. You will also remove watches, jewelry, hairpins, wallets, dentures, partial dental plates and hearing aids. You may wear contact lenses, and you may be able to wear your rings. We have a safe place to keep your personal items, but it is safer to leave them at home.   **IMPORTANT** THE INSTRUCTIONS BELOW ARE ONLY FOR THOSE PATIENTS WHO HAVE BEEN TOLD THEY WILL RECEIVE SEDATION OR GENERAL ANESTHESIA DURING THEIR MRI PROCEDURE:  IF YOU WILL RECEIVE SEDATION (take medicine to help you relax during your exam):   You must get the medicine from your doctor before you arrive. Bring the medicine to the exam. Do not take it at home.   Arrive one hour early. Bring someone who can take you home after the test. Your medicine will make you sleepy. After the exam, you may not drive, take a bus or take a taxi by yourself.   No eating 8 hours before your exam. You may have clear liquids up until 4 hours before your exam. (Clear liquids include water, clear tea, black coffee and fruit juice without pulp.)  IF YOU WILL RECEIVE ANESTHESIA (be asleep for your exam):   Arrive 1 1/2 hours early. Bring someone who can take you home after the test. You may not drive, take a bus or take a taxi by yourself.   No eating 8 hours before your exam. You may have clear liquids up until 4 hours before your exam. (Clear liquids include water, clear tea, black coffee and fruit juice without pulp.)  Please call the Imaging Department at your exam site with any questions.              Who to contact     If you have questions or need follow up information about today's clinic visit or your schedule please contact [facility] SPECIALTY AND PROCEDURE directly at [phone].  Normal or non-critical lab and imaging results will be communicated to you by MyChart, letter or phone within 4 business days after the clinic has received the results. If you do not hear from us within 7 days, please contact the clinic  "through "[employer] or phone. If you have a critical or abnormal lab result, we will notify you by phone as soon as possible.  Submit refill requests through "[employer] or call your pharmacy and they will forward the refill request to us. Please allow 3 business days for your refill to be completed.          Additional Information About Your Visit        [email or web address] Information     "[employer] lets you send messages to your doctor, view your test results, renew your prescriptions, schedule appointments and more. To sign up, go to [email or web address] . Click on \"Log in\" on the left side of the screen, which will take you to the Welcome page. Then click on \"Sign up Now\" on the right side of the page.     You will be asked to enter the access code listed below, as well as some personal information. Please follow the directions to create your username and password.     Your access code is: [email or web address]  Expires: 3/29/2018  6:31 AM     Your access code will  in 90 days. If you need help or a new code, please call your [facility] clinic or [phone].        Care EveryWhere ID     This is your Care EveryWhere ID. This could be used by other organizations to access your [facility] medical records  [id number]        Your Vitals Were     Pulse Temperature Respirations             57 97.5  F (36.4  C) (Oral) 16          Blood Pressure from Last 3 Encounters:   18 117/73   18 117/74   14 (!) 148/105    Weight from Last 3 Encounters:   18 90.7 kg (200 lb)   14 96.6 kg (213 lb)              Today, you had the following     No orders found for display       Primary Care Provider Office Phone # Fax #    Isaiah Flower [phone]       [facility] [address] [place] [address]        Equal Access to Services     HUGO HARRY : Lupe pereira Soverona, waaxda luqadaha, qaybta kaalmada adeegyada, luis e feng. So [facility] [phone].    ATENCIÓN: " Si lee vaughn, tiene a mckeon disposición servicios gratuitos de asistencia lingüística. Aleshia caicedo [phone].    We comply with applicable federal civil rights laws and [place] laws. We do not discriminate on the basis of race, color, national origin, age, disability, sex, sexual orientation, or gender identity.            Thank you!     Thank you for choosing [facility] SPECIALTY [facility] PROCEDURE  for your care. Our goal is always to provide you with excellent care. Hearing back from our patients is one way we can continue to improve our services. Please take a few minutes to complete the written survey that you may receive in the mail after your visit with us. Thank you!             Your Updated Medication List - Protect others around you: Learn how to safely use, store and throw away your medicines at www.disposemymeds.org.          This list is accurate as of: 1/8/18  4:22 PM.  Always use your most recent med list.                   Brand Name Dispense Instructions for use Diagnosis    * AZATHIOPRINE PO      Take 50 mg by mouth 2 times daily 150 mg in the am, 100 mg in the PM.        * azaTHIOprine 50 MG tablet    IMURAN          CIALIS 20 MG tablet   Generic drug:  tadalafil      Take 20 mg by mouth        OXYBUTYNIN CHLORIDE ER PO      Take by mouth daily        sildenafil 50 MG tablet    VIAGRA     Use 1/2 - 2 tablets ( MG) at least 30 minutes prior to intercourse.        SULFAMETHOXAZOLE-TRIMETHOPRIM PO      Take by mouth daily Standard amount?        vitamin D3 1000 UNITS Caps      Take 1,000 Units by mouth        * Notice:  This list has 2 medication(s) that are the same as other medications prescribed for you. Read the directions carefully, and ask your doctor or other care provider to review them with you.

## 2018-01-08 NOTE — PROGRESS NOTES
Nursing Note  Compa Pennington [facility] for:   Chief Complaint   Patient presents with     Infusion     solumedrol     During today's [facility] appointment, orders from Dr. Rose were completed.  Frequency: today is dose 2 of 5 total.    Progress note:  Patient identification verified by name and date of birth.  Assessment completed.  Vitals recorded in Doc Flowsheets.  Patient was provided with education regarding infusion and possible side effects.  Patient verbalized understanding.      needed: No  Premedications: were not ordered.  Infusion Rates: infusion given over approximately 1 hour.  Labs: were not ordered for this appointment.  Vascular access: peripheral IV placed today.  Treatment Conditions: non-applicable.  Patient tolerated infusion: well.    Discharge Plan:   Follow up plan of care with: ongoing infusions at [facility].  Discharge instructions were reviewed with patient.  Patient/representative verbalized understanding of discharge instructions and all questions answered.  Patient discharged from [facility] in stable condition.    ÁLVARO ALTAMIRANO RN    Administrations This Visit     methylPREDNISolone sodium succinate (solu-MEDROL) 1,000 mg in NaCl 0.9 % 250 mL intermittent infusion     Admin Date Action Dose Rate Route Administered By          01/08/2018 New Bag 1000 mg 291 mL/hr Intravenous Nahomy Claudio RN                         /82  Pulse 61  Temp 97.5  F (36.4  C) (Oral)  Resp 16

## 2018-01-08 NOTE — PATIENT INSTRUCTIONS
Dear Compa Pennington    Thank you for choosing [facility] ([facility]) for your infusion.  The following information is a summary of our appointment as well as important reminders.      We look forward in seeing you on your next appointment here at [facility].  Please don t hesitate to call us at [phone] to reschedule any of your appointments or to speak with one of the [facility] registered nurses.  It was a pleasure taking care of you today.    Sincerely,    [facility]  Specialty Infusion [facility] Procedure Center  [address] [place] [address]  [address]  Phone:  [phone]

## 2018-01-09 ENCOUNTER — INFUSION THERAPY VISIT (OUTPATIENT)
Dept: INFUSION THERAPY | Facility: CLINIC | Age: 38
End: 2018-01-09
Attending: OPHTHALMOLOGY
Payer: COMMERCIAL

## 2018-01-09 VITALS
DIASTOLIC BLOOD PRESSURE: 66 MMHG | OXYGEN SATURATION: 98 % | HEART RATE: 63 BPM | SYSTOLIC BLOOD PRESSURE: 119 MMHG | RESPIRATION RATE: 16 BRPM | TEMPERATURE: 97.3 F

## 2018-01-09 DIAGNOSIS — H46.9 RECURRENT OPTIC NEURITIS: Primary | ICD-10-CM

## 2018-01-09 PROCEDURE — 25000128 H RX IP 250 OP 636: Mod: ZF | Performed by: OPHTHALMOLOGY

## 2018-01-09 PROCEDURE — 96365 THER/PROPH/DIAG IV INF INIT: CPT

## 2018-01-09 RX ADMIN — SODIUM CHLORIDE 1000 MG: 9 INJECTION, SOLUTION INTRAVENOUS at 14:34

## 2018-01-09 NOTE — PATIENT INSTRUCTIONS
Dear Compa Pennington    Thank you for choosing [facility] ([facility]) for your infusion.  The following information is a summary of our appointment as well as important reminders.        Methylprednisolone Solution for Injection  Brand Names: A-Methapred, Solu-Medrol  What is this medicine?  METHYLPREDNISOLONE (meth ill pred NISS oh lone) is a corticosteroid. It is commonly used to treat inflammation of the skin, joints, lungs, and other organs. Common conditions treated include asthma, allergies, and arthritis. It is also used for other conditions, such as blood disorders and diseases of the adrenal glands.  How should I use this medicine?  This medicine is for injection or infusion into a vein. It is also for injection into a muscle. It is given by a health care professional in a hospital or clinic setting.  Talk to your pediatrician regarding the use of this medicine in children. While this drug may be prescribed for selected conditions, precautions do apply.  What side effects may I notice from receiving this medicine?  Side effects that you should report to your doctor or health care professional as soon as possible:    allergic reactions like skin rash, itching or hives, swelling of the face, lips, or tongue    bloody or tarry stools    changes in vision    hallucination, loss of contact with reality    muscle cramps    muscle pain    palpitations    signs and symptoms of high blood sugar such as dizziness; dry mouth; dry skin; fruity breath; nausea; stomach pain; increased hunger or thirst; increased urination    signs and symptoms of infection like fever or chills; cough; sore throat; pain or trouble passing urine    trouble passing urine or change in the amount of urine  Side effects that usually do not require medical attention (report to your doctor or health care professional if they continue or are bothersome):    changes in emotions or  mood    constipation    diarrhea    excessive hair growth on the face or body    headache    nausea, vomiting    pain, redness, or irritation at site where injected    trouble sleeping    weight gain  What may interact with this medicine?  Do not take this medicine with any of the following medications:    alefacept    echinacea    iopamidol    live virus vaccines    metyrapone    mifepristone  This medicine may also interact with the following medications:    amphotericin B    aspirin and aspirin-like medicines    certain antibiotics like erythromycin, clarithromycin, troleandomycin    certain medicines for diabetes    certain medicines for fungal infection like ketoconazole    certain medicines for seizures like carbamazepine, phenobarbital, phenytoin    certain medicines that treat or prevent blood clots like warfarin    cyclosporine    digoxin    diuretics    female hormones, like estrogens and birth control pills    isoniazid    NSAIDS, medicines for pain and inflammation, like ibuprofen or naproxen    other medicines for myasthenia gravis    rifampin    vaccines  What if I miss a dose?  This does not apply.  Where should I keep my medicine?  This drug is given in a hospital or clinic and will not be stored at home.  What should I tell my health care provider before I take this medicine?  They need to know if you have any of these conditions:    Cushing's syndrome    eye disease, vision problems    diabetes    glaucoma    heart disease    high blood pressure    infection (especially a virus infection such as chickenpox, cold sores, or herpes)    liver disease    mental illness    myasthenia gravis    osteoporosis    recently received or scheduled to receive a vaccine    seizures    stomach or intestine problems    thyroid disease    an unusual or allergic reaction to lactose, methylprednisolone, other medicines, foods, dyes, or preservatives    pregnant or trying to get pregnant    breast-feeding  What should I  watch for while using this medicine?  Tell your doctor or healthcare professional if your symptoms do not start to get better or if they get worse. Do not stop taking except on your doctor's advice. You may develop a severe reaction. Your doctor will tell you how much medicine to take.  Your condition will be monitored carefully while you are receiving this medicine.  This medicine may increase your risk of getting an infection. Tell your doctor or health care professional if you are around anyone with measles or chickenpox, or if you develop sores or blisters that do not heal properly.  This medicine may affect blood sugar levels. If you have diabetes, check with your doctor or health care professional before you change your diet or the dose of your diabetic medicine.  Tell your doctor or health care professional right away if you have any change in your eyesight.  Using this medicine for a long time may increase your risk of low bone mass. Talk to your doctor about bone health.  NOTE:This sheet is a summary. It may not cover all possible information. If you have questions about this medicine, talk to your doctor, pharmacist, or health care provider. Copyright  2017 [employer]          Additional information: you received an infusion of Solumedrol 1,000 mg via IV today.       We look forward in seeing you on your next appointment here at [facility].  Please don t hesitate to call us at [phone] to reschedule any of your appointments or to speak with one of the [facility] registered nurses.  It was a pleasure taking care of you today.    Sincerely,    [facility]  Specialty Infusion & Procedure Center  [address] [place] [address]  [address]  Phone:  [phone]

## 2018-01-09 NOTE — MR AVS SNAPSHOT
After Visit Summary   1/9/2018    Compa Pennington    MRN: [id number]           Patient Information     Date Of Birth          1980        Visit Information        Provider Department      1/9/2018 2:00 PM UC 46 ATC;  SPEC [facility]        Today's Diagnoses     Recurrent optic neuritis    -  1      Care Instructions    Dear Compa Pennington    Thank you for choosing [facility] ([facility] for your infusion.  The following information is a summary of our appointment as well as important reminders.        Methylprednisolone Solution for Injection  Brand Names: A-Methapred, Solu-Medrol  What is this medicine?  METHYLPREDNISOLONE (meth ill pred NISS oh lone) is a corticosteroid. It is commonly used to treat inflammation of the skin, joints, lungs, and other organs. Common conditions treated include asthma, allergies, and arthritis. It is also used for other conditions, such as blood disorders and diseases of the adrenal glands.  How should I use this medicine?  This medicine is for injection or infusion into a vein. It is also for injection into a muscle. It is given by a health care professional in a hospital or clinic setting.  Talk to your pediatrician regarding the use of this medicine in children. While this drug may be prescribed for selected conditions, precautions do apply.  What side effects may I notice from receiving this medicine?  Side effects that you should report to your doctor or health care professional as soon as possible:    allergic reactions like skin rash, itching or hives, swelling of the face, lips, or tongue    bloody or tarry stools    changes in vision    hallucination, loss of contact with reality    muscle cramps    muscle pain    palpitations    signs and symptoms of high blood sugar such as dizziness; dry mouth; dry skin; fruity breath; nausea; stomach pain;  increased hunger or thirst; increased urination    signs and symptoms of infection like fever or chills; cough; sore throat; pain or trouble passing urine    trouble passing urine or change in the amount of urine  Side effects that usually do not require medical attention (report to your doctor or health care professional if they continue or are bothersome):    changes in emotions or mood    constipation    diarrhea    excessive hair growth on the face or body    headache    nausea, vomiting    pain, redness, or irritation at site where injected    trouble sleeping    weight gain  What may interact with this medicine?  Do not take this medicine with any of the following medications:    alefacept    echinacea    iopamidol    live virus vaccines    metyrapone    mifepristone  This medicine may also interact with the following medications:    amphotericin B    aspirin and aspirin-like medicines    certain antibiotics like erythromycin, clarithromycin, troleandomycin    certain medicines for diabetes    certain medicines for fungal infection like ketoconazole    certain medicines for seizures like carbamazepine, phenobarbital, phenytoin    certain medicines that treat or prevent blood clots like warfarin    cyclosporine    digoxin    diuretics    female hormones, like estrogens and birth control pills    isoniazid    NSAIDS, medicines for pain and inflammation, like ibuprofen or naproxen    other medicines for myasthenia gravis    rifampin    vaccines  What if I miss a dose?  This does not apply.  Where should I keep my medicine?  This drug is given in a hospital or clinic and will not be stored at home.  What should I tell my health care provider before I take this medicine?  They need to know if you have any of these conditions:    Cushing's syndrome    eye disease, vision problems    diabetes    glaucoma    heart disease    high blood pressure    infection (especially a virus infection such as chickenpox, cold sores,  or herpes)    liver disease    mental illness    myasthenia gravis    osteoporosis    recently received or scheduled to receive a vaccine    seizures    stomach or intestine problems    thyroid disease    an unusual or allergic reaction to lactose, methylprednisolone, other medicines, foods, dyes, or preservatives    pregnant or trying to get pregnant    breast-feeding  What should I watch for while using this medicine?  Tell your doctor or healthcare professional if your symptoms do not start to get better or if they get worse. Do not stop taking except on your doctor's advice. You may develop a severe reaction. Your doctor will tell you how much medicine to take.  Your condition will be monitored carefully while you are receiving this medicine.  This medicine may increase your risk of getting an infection. Tell your doctor or health care professional if you are around anyone with measles or chickenpox, or if you develop sores or blisters that do not heal properly.  This medicine may affect blood sugar levels. If you have diabetes, check with your doctor or health care professional before you change your diet or the dose of your diabetic medicine.  Tell your doctor or health care professional right away if you have any change in your eyesight.  Using this medicine for a long time may increase your risk of low bone mass. Talk to your doctor about bone health.  NOTE:This sheet is a summary. It may not cover all possible information. If you have questions about this medicine, talk to your doctor, pharmacist, or health care provider. Copyright  2017 [employer]          Additional information: you received an infusion of Solumedrol 1,000 mg via IV today.       We look forward in seeing you on your next appointment here at [facility].  Please don t hesitate to call us at [phone] to reschedule any of your appointments or to speak with one of the [facility] registered nurses.  It was a pleasure taking care of you  today.    Sincerely,    [facility]  Specialty Infusion [facility] Procedure Center  [address] [place]  [address]  Phone:  [phone]            Follow-ups after your visit        Your next 10 appointments already scheduled     Kirill 10, 2018  2:00 PM CST   Infusion 120 with UC SPEC INFUSION, UC 50 ATC   [facility] Specialty and Procedure ([facility])    [address]  [address]  [place] [facility] [address] [phone]            Jan 11, 2018  3:00 PM CST   Infusion 120 with UC SPEC INFUSION, UC 49 ATC   [facility] Procedure ([facility])    [address]  [address]  [facility] [address] [phone]            Kirill 15, 2018  2:00 PM CST   RETURN NEURO with Cesario Pappas MD   Eye Clinic ([facility])    [address]  [address]  [facility] [address] [phone]              Who to contact     If you have questions or need follow up information about today's clinic visit or your schedule please contact [facility] SPECIALTY AND PROCEDURE directly at [phone].  Normal or non-critical lab and imaging results will be communicated to you by MyChart, letter or phone within 4 business days after the clinic has received the results. If you do not hear from us within 7 days, please contact the clinic through MyChart or phone. If you have a critical or abnormal lab result, we will notify you by phone as soon as possible.  Submit refill requests through [employer] or call your pharmacy and they will forward the refill request to us. Please allow 3 business days for your refill to be completed.          Additional Information About Your Visit        [employer] Information     [employer] lets you send messages to your doctor, view your test results, renew your prescriptions, schedule  "appointments and more. To sign up, go to [email or web address] . Click on \"Log in\" on the left side of the screen, which will take you to the Welcome page. Then click on \"Sign up Now\" on the right side of the page.     You will be asked to enter the access code listed below, as well as some personal information. Please follow the directions to create your username and password.     Your access code is: [email or web address]  Expires: 3/29/2018  6:31 AM     Your access code will  in 90 days. If you need help or a new code, please call your [facility] clinic or [phone].        Care EveryWhere ID     This is your Care EveryWhere ID. This could be used by other organizations to access your [facility] medical records  [id number]        Your Vitals Were     Pulse Temperature Respirations Pulse Oximetry          63 97.3  F (36.3  C) (Tympanic) 16 98%         Blood Pressure from Last 3 Encounters:   18 119/66   18 117/73   18 117/74    Weight from Last 3 Encounters:   18 90.7 kg (200 lb)   14 96.6 kg (213 lb)              Today, you had the following     No orders found for display       Primary Care Provider Office Phone # Fax #    Isaiah Flower [phone]       [facility] [address]  [facility] [address]        Equal Access to Services     HUGO HARRY AH: Hadii aad ku hadasho Sobrennenali, waaxda luqadaha, qaybta kaalmada adeegyada, luis e feng. So [facility] [phone].    ATENCIÓN: Si habla español, tiene a mckeon disposición servicios gratuitos de asistencia lingüística. Aleshia al [phone].    We comply with applicable federal civil rights laws and [place] laws. We do not discriminate on the basis of race, color, national origin, age, disability, sex, sexual orientation, or gender identity.            Thank you!     Thank you for choosing [facility] SPECIALTY [facility] PROCEDURE  for your care. Our goal is always to " provide you with excellent care. Hearing back from our patients is one way we can continue to improve our services. Please take a few minutes to complete the written survey that you may receive in the mail after your visit with us. Thank you!             Your Updated Medication List - Protect others around you: Learn how to safely use, store and throw away your medicines at www.disposemymeds.org.          This list is accurate as of: 1/9/18  4:05 PM.  Always use your most recent med list.                   Brand Name Dispense Instructions for use Diagnosis    * AZATHIOPRINE PO      Take 50 mg by mouth 2 times daily 150 mg in the am, 100 mg in the PM.        * azaTHIOprine 50 MG tablet    IMURAN          CIALIS 20 MG tablet   Generic drug:  tadalafil      Take 20 mg by mouth        OXYBUTYNIN CHLORIDE ER PO      Take by mouth daily        sildenafil 50 MG tablet    VIAGRA     Use 1/2 - 2 tablets ( MG) at least 30 minutes prior to intercourse.        SULFAMETHOXAZOLE-TRIMETHOPRIM PO      Take by mouth daily Standard amount?        vitamin D3 1000 UNITS Caps      Take 1,000 Units by mouth        * Notice:  This list has 2 medication(s) that are the same as other medications prescribed for you. Read the directions carefully, and ask your doctor or other care provider to review them with you.

## 2018-01-09 NOTE — PROGRESS NOTES
Nursing Note  Compa Pennington [facility] for:   Chief Complaint   Patient presents with     Infusion     Solumedrol     During today's [facility] appointment, orders from Dr. Rose were completed.  Frequency: today is dose 3 of 5 total. (received 1st dose in ER on 1/6/18)    Progress note:  Patient identification verified by name and date of birth.  Assessment completed.  Vitals recorded in Doc Flowsheets.  Patient was provided with education regarding infusion and possible side effects.  Patient verbalized understanding.      needed: No  Premedications: were not ordered.  Infusion Rates: infusion given over approximately 1 hour.  Approximate Infusion length:1 hours.   Labs: were not ordered for this appointment.  Vascular access: peripheral IV placed today.  Treatment Conditions: non-applicable.  Patient tolerated infusion: well.          Discharge Plan:   Follow up plan of care with: ongoing infusions at [facility].  Discharge instructions were reviewed with patient.  Patient/representative verbalized understanding of discharge instructions and all questions answered.  Patient discharged from [facility] in stable condition.    Jocelyne Gabriel RN    Administrations This Visit     methylPREDNISolone sodium succinate (solu-MEDROL) 1,000 mg in NaCl 0.9 % 250 mL intermittent infusion     Admin Date Action Dose Route Administered By             01/09/2018 New Bag 1000 mg Intravenous Jocelyne Gabriel RN                          /71  Pulse 61  Temp 97.3  F (36.3  C) (Tympanic)  Resp 16  SpO2 98%

## 2018-01-10 ENCOUNTER — INFUSION THERAPY VISIT (OUTPATIENT)
Dept: INFUSION THERAPY | Facility: CLINIC | Age: 38
End: 2018-01-10
Attending: OPHTHALMOLOGY
Payer: COMMERCIAL

## 2018-01-10 VITALS
OXYGEN SATURATION: 100 % | DIASTOLIC BLOOD PRESSURE: 70 MMHG | HEART RATE: 58 BPM | TEMPERATURE: 96.4 F | SYSTOLIC BLOOD PRESSURE: 118 MMHG | RESPIRATION RATE: 18 BRPM

## 2018-01-10 DIAGNOSIS — H46.9 RECURRENT OPTIC NEURITIS: Primary | ICD-10-CM

## 2018-01-10 PROCEDURE — 25000128 H RX IP 250 OP 636: Mod: ZF | Performed by: OPHTHALMOLOGY

## 2018-01-10 PROCEDURE — 96365 THER/PROPH/DIAG IV INF INIT: CPT

## 2018-01-10 RX ADMIN — SODIUM CHLORIDE 1000 MG: 9 INJECTION, SOLUTION INTRAVENOUS at 14:22

## 2018-01-10 NOTE — PROGRESS NOTES
Nursing Note  Compa Pennington presents today to [facility] for:   Chief Complaint   Patient presents with     Infusion     Solumedrol     During today's [facility] appointment, orders from Dr. Mil Rose were completed.  Frequency: today is dose 4 of 5 total. (Therapy plan states 4 doses but had dose inpatient)    Progress note:  Patient identification verified by name and date of birth.  Assessment completed.  Vitals recorded in Doc Flowsheets.  Patient was provided with education regarding infusion and possible side effects.  Patient verbalized understanding.     Discussed side effects with patient. States he has insomnia, increased appetite and mood changes. Patient states he has had high dose steroids multiple times      needed: No  Premedications: were not ordered.  Infusion Rates: given over approximately 1 hour.  Labs: were not ordered for this appointment.  Vascular access: peripheral IV placed today.  Treatment Conditions: non-applicable.  Patient tolerated infusion: well.    Discharge Plan:   Follow up plan of care with: ongoing infusions at [facility] Procedure Center for one more dose tomorrow 1/11/18  Discharge instructions were reviewed with patient.  Patient/representative verbalized understanding of discharge instructions and all questions answered.  Patient discharged from [facility] in stable condition.    Ericka Severson, RN    Administrations This Visit     methylPREDNISolone sodium succinate (solu-MEDROL) 1,000 mg in NaCl 0.9 % 250 mL intermittent infusion     Admin Date Action Dose Rate Route Administered By          01/10/2018 New Bag 1000 mg 291 mL/hr Intravenous Severson, Ericka, RN                         /70  Pulse 63  Temp 96.4  F (35.8  C) (Tympanic)  Resp 18  SpO2 100%

## 2018-01-10 NOTE — MR AVS SNAPSHOT
After Visit Summary   1/10/2018    Compa Pennington    MRN: [id number]           Patient Information     Date Of Birth          1980        Visit Information        Provider Department      1/10/2018 2:00 PM UC 50 ATC; UC SPEC INFUSION [facility] Specialty and Procedure        Today's Diagnoses     Recurrent optic neuritis    -  1       Follow-ups after your visit        Your next 10 appointments already scheduled     Jan 11, 2018  3:00 PM CST   Infusion 120 with UC SPEC INFUSION, UC 49 ATC   [facility] Specialty and Procedure ([facility])    [address]  [address]  [place] [facility] [id number] [phone]            Kirill 15, 2018  2:00 PM CST   RETURN NEURO with Cesario Pappas MD   Eye Clinic ([facility])    [address] [facility]  [address]  [facility] [id number] [phone]              Who to contact     If you have questions or need follow up information about today's clinic visit or your schedule please contact [facility] SPECIALTY AND PROCEDURE directly at [phone].  Normal or non-critical lab and imaging results will be communicated to you by [employer], letter or phone within 4 business days after the clinic has received the results. If you do not hear from us within 7 days, please contact the clinic through [employer] or phone. If you have a critical or abnormal lab result, we will notify you by phone as soon as possible.  Submit refill requests through [employer] or call your pharmacy and they will forward the refill request to us. Please allow 3 business days for your refill to be completed.          Additional Information About Your Visit        [employer] Information     [employer] lets you send messages to your doctor, view your test results, renew your prescriptions, schedule appointments and more. To sign up, go to [email or web address] .  "Click on \"Log in\" on the left side of the screen, which will take you to the Welcome page. Then click on \"Sign up Now\" on the right side of the page.     You will be asked to enter the access code listed below, as well as some personal information. Please follow the directions to create your username and password.     Your access code is: [email or web address]  Expires: 3/29/2018  6:31 AM     Your access code will  in 90 days. If you need help or a new code, please call your [facility] or [phone].        Care EveryWhere ID     This is your Care EveryWhere ID. This could be used by other organizations to access your [place] medical records  [id number]        Your Vitals Were     Pulse Temperature Respirations Pulse Oximetry          58 96.4  F (35.8  C) (Tympanic) 18 100%         Blood Pressure from Last 3 Encounters:   01/10/18 118/70   18 119/66   18 117/73    Weight from Last 3 Encounters:   18 90.7 kg (200 lb)   14 96.6 kg (213 lb)              Today, you had the following     No orders found for display       Primary Care Provider Office Phone # Fax #    Isaiah RIZVI Mundo [phone]       [facility] [address]   [facility] [address]        Equal Access to Services     HUGO HARRY AH: Hadii aad ku hadasho Soomaali, waaxda luqadaha, qaybta kaalmada dainyada, luis e garibay . So [facility] [phone].    ATENCIÓN: Si habla español, tiene a mckeon disposición servicios gratuitos de asistencia lingüística. Aleshia al [phone].    We comply with applicable federal civil rights laws and [place] laws. We do not discriminate on the basis of race, color, national origin, age, disability, sex, sexual orientation, or gender identity.            Thank you!     Thank you for choosing [facility] SPECIALTY AND PROCEDURE  for your care. Our goal is always to provide you with excellent care. Hearing back from our patients is one " way we can continue to improve our services. Please take a few minutes to complete the written survey that you may receive in the mail after your visit with us. Thank you!             Your Updated Medication List - Protect others around you: Learn how to safely use, store and throw away your medicines at www.disposemymeds.org.          This list is accurate as of: 1/10/18  3:28 PM.  Always use your most recent med list.                   Brand Name Dispense Instructions for use Diagnosis    * AZATHIOPRINE PO      Take 50 mg by mouth 2 times daily 150 mg in the am, 100 mg in the PM.        * azaTHIOprine 50 MG tablet    IMURAN          CIALIS 20 MG tablet   Generic drug:  tadalafil      Take 20 mg by mouth        OXYBUTYNIN CHLORIDE ER PO      Take by mouth daily        sildenafil 50 MG tablet    VIAGRA     Use 1/2 - 2 tablets ( MG) at least 30 minutes prior to intercourse.        SULFAMETHOXAZOLE-TRIMETHOPRIM PO      Take by mouth daily Standard amount?        vitamin D3 1000 UNITS Caps      Take 1,000 Units by mouth        * Notice:  This list has 2 medication(s) that are the same as other medications prescribed for you. Read the directions carefully, and ask your doctor or other care provider to review them with you.

## 2018-01-11 ENCOUNTER — INFUSION THERAPY VISIT (OUTPATIENT)
Dept: INFUSION THERAPY | Facility: CLINIC | Age: 38
End: 2018-01-11
Attending: OPHTHALMOLOGY
Payer: COMMERCIAL

## 2018-01-11 VITALS
SYSTOLIC BLOOD PRESSURE: 131 MMHG | RESPIRATION RATE: 18 BRPM | OXYGEN SATURATION: 100 % | TEMPERATURE: 96 F | DIASTOLIC BLOOD PRESSURE: 74 MMHG | HEART RATE: 59 BPM

## 2018-01-11 DIAGNOSIS — H46.9 RECURRENT OPTIC NEURITIS: Primary | ICD-10-CM

## 2018-01-11 DIAGNOSIS — H53.10 SUBJECTIVE VISION DISTURBANCE: Primary | ICD-10-CM

## 2018-01-11 PROCEDURE — 96365 THER/PROPH/DIAG IV INF INIT: CPT

## 2018-01-11 PROCEDURE — 25000128 H RX IP 250 OP 636: Mod: ZF | Performed by: OPHTHALMOLOGY

## 2018-01-11 RX ADMIN — SODIUM CHLORIDE 1000 MG: 9 INJECTION, SOLUTION INTRAVENOUS at 14:44

## 2018-01-11 NOTE — PROGRESS NOTES
Nursing Note  Compa Pennington presents today [facility] for:   Chief Complaint   Patient presents with     Infusion     Solu-medrol (methlyprednisolone)     During today's [facility] appointment, orders from Dr. Rose were completed.  Frequency: today is dose 5 of 5 total.    Progress note:  Patient identification verified by name and date of birth.  Assessment completed.  Vitals recorded in Doc Flowsheets.  Patient was provided with education regarding infusion and possible side effects.  Patient verbalized understanding.      needed: No  Premedications: were not ordered.  Infusion Rates: given over one hour.  Approximate Infusion length:1 1/2 hours.   Labs: were not ordered for this appointment.  Vascular access: peripheral IV placed today.  Treatment Conditions: patient denies fever, chills, signs of infection, recent illness, on antibiotics, productive cough or elevated temperature.  Patient tolerated infusion: well.    Drug Waste Record? No     Discharge Plan:   Follow up plan of care with: ongoing infusions at [facility].  Discharge instructions were reviewed with patient.  Patient/representative verbalized understanding of discharge instructions and all questions answered.  Patient discharged from [facility] in stable condition.    Liz Arndt RN    Administrations This Visit     methylPREDNISolone sodium succinate (solu-MEDROL) 1,000 mg in NaCl 0.9 % 250 mL intermittent infusion     Admin Date Action Dose Rate Route Administered By          01/11/2018 New Bag 1000 mg 291 mL/hr Intravenous Liz Arndt RN                         /68  Pulse 63  Temp 96  F (35.6  C) (Tympanic)  Resp 18  SpO2 100%

## 2018-01-11 NOTE — MR AVS SNAPSHOT
After Visit Summary   1/11/2018    Compa Pennington    MRN: [id number]           Patient Information     Date Of Birth          1980        Visit Information        Provider Department      1/11/2018 3:00 PM UC 49 ATC; UC SPEC INFUSION [facility] Specialty and Procedure        Today's Diagnoses     Recurrent optic neuritis    -  1       Follow-ups after your visit        Your next 10 appointments already scheduled     Kirill 15, 2018  2:00 PM CST   RETURN NEURO with Cesario Pappas MD   Eye Clinic ([facility])    [address]  [address]  [address]  [facility] [id number] [phone]              Future tests that were ordered for you today     Open Future Orders        Priority Expected Expires Ordered    IOP Measurement Routine  7/15/2019 1/11/2018    Color Vision - Screening OU (both eyes) Routine  7/15/2019 1/11/2018    DILATED FUNDUS EXAM Routine  7/15/2019 1/11/2018    Glaucoma Top OU Routine  7/15/2019 1/11/2018            Who to contact     If you have questions or need follow up information about today's clinic visit or your schedule please contact [facility] SPECIALTY AND PROCEDURE directly at [phone].  Normal or non-critical lab and imaging results will be communicated to you by MyChart, letter or phone within 4 business days after the clinic has received the results. If you do not hear from us within 7 days, please contact the clinic through MyChart or phone. If you have a critical or abnormal lab result, we will notify you by phone as soon as possible.  Submit refill requests through [employer] or call your pharmacy and they will forward the refill request to us. Please allow 3 business days for your refill to be completed.          Additional Information About Your Visit        MyChart Information     [employer] lets you send messages to your doctor, view your test results, renew your prescriptions,  "schedule appointments and more. To sign up, go to [email or web address] . Click on \"Log in\" on the left side of the screen, which will take you to the Welcome page. Then click on \"Sign up Now\" on the right side of the page.     You will be asked to enter the access code listed below, as well as some personal information. Please follow the directions to create your username and password.     Your access code is: [email or web address]  Expires: 3/29/2018  6:31 AM     Your access code will  in 90 days. If you need help or a new code, please call your [place] clinic or [phone].        Care EveryWhere ID     This is your Care EveryWhere ID. This could be used by other organizations to access your [facility] medical records  [id number]        Your Vitals Were     Pulse Temperature Respirations Pulse Oximetry          59 96  F (35.6  C) (Tympanic) 18 100%         Blood Pressure from Last 3 Encounters:   18 131/74   01/10/18 118/70   18 119/66    Weight from Last 3 Encounters:   18 90.7 kg (200 lb)   14 96.6 kg (213 lb)              Today, you had the following     No orders found for display       Primary Care Provider Office Phone # Fax #    Isaiah Flower [phone]       [facility] [address]  [facility] [address]        Equal Access to Services     HUGO HARRY AH: Hadii teresa cowano Soverona, waaxda luqadaha, qaybta kaalmada dainyada, luis e feng. So [facility] [phone].    ATENCIÓN: Si habla español, tiene a mckeon disposición servicios gratuitos de asistencia lingüística. Aleshia al [phone].    We comply with applicable federal civil rights laws and [place] laws. We do not discriminate on the basis of race, color, national origin, age, disability, sex, sexual orientation, or gender identity.            Thank you!     Thank you for choosing [facility] SPECIALTY AND PROCEDURE  for your care. Our goal is " always to provide you with excellent care. Hearing back from our patients is one way we can continue to improve our services. Please take a few minutes to complete the written survey that you may receive in the mail after your visit with us. Thank you!             Your Updated Medication List - Protect others around you: Learn how to safely use, store and throw away your medicines at www.disposemymeds.org.          This list is accurate as of: 1/11/18 11:59 PM.  Always use your most recent med list.                   Brand Name Dispense Instructions for use Diagnosis    * AZATHIOPRINE PO      Take 50 mg by mouth 2 times daily 150 mg in the am, 100 mg in the PM.        * azaTHIOprine 50 MG tablet    IMURAN          CIALIS 20 MG tablet   Generic drug:  tadalafil      Take 20 mg by mouth        OXYBUTYNIN CHLORIDE ER PO      Take by mouth daily        sildenafil 50 MG tablet    VIAGRA     Use 1/2 - 2 tablets ( MG) at least 30 minutes prior to intercourse.        SULFAMETHOXAZOLE-TRIMETHOPRIM PO      Take by mouth daily Standard amount?        vitamin D3 1000 UNITS Caps      Take 1,000 Units by mouth        * Notice:  This list has 2 medication(s) that are the same as other medications prescribed for you. Read the directions carefully, and ask your doctor or other care provider to review them with you.

## 2018-01-15 ENCOUNTER — OFFICE VISIT (OUTPATIENT)
Dept: OPHTHALMOLOGY | Facility: CLINIC | Age: 38
End: 2018-01-15
Attending: OPHTHALMOLOGY
Payer: COMMERCIAL

## 2018-01-15 DIAGNOSIS — G36.0 NEUROMYELITIS OPTICA (H): ICD-10-CM

## 2018-01-15 DIAGNOSIS — H46.9 OPTIC NEURITIS, RIGHT: Primary | ICD-10-CM

## 2018-01-15 DIAGNOSIS — H53.10 SUBJECTIVE VISION DISTURBANCE: ICD-10-CM

## 2018-01-15 PROCEDURE — 92083 EXTENDED VISUAL FIELD XM: CPT | Mod: ZF | Performed by: OPHTHALMOLOGY

## 2018-01-15 PROCEDURE — G0463 HOSPITAL OUTPT CLINIC VISIT: HCPCS | Mod: ZF | Performed by: TECHNICIAN/TECHNOLOGIST

## 2018-01-15 ASSESSMENT — VISUAL ACUITY
CORRECTION_TYPE: GLASSES
METHOD: SNELLEN - LINEAR
OD_CC: 20/20
OS_CC: 20/20

## 2018-01-15 ASSESSMENT — CUP TO DISC RATIO
OD_RATIO: 0.3
OS_RATIO: 0.2

## 2018-01-15 ASSESSMENT — EXTERNAL EXAM - LEFT EYE: OS_EXAM: NORMAL

## 2018-01-15 ASSESSMENT — SLIT LAMP EXAM - LIDS
COMMENTS: NORMAL
COMMENTS: NORMAL

## 2018-01-15 ASSESSMENT — REFRACTION_WEARINGRX
OD_AXIS: 055
OD_SPHERE: -2.00
OS_CYLINDER: +2.25
OS_SPHERE: -3.00
OS_AXIS: 105
OD_CYLINDER: +1.00

## 2018-01-15 ASSESSMENT — TONOMETRY
IOP_METHOD: ICARE
OS_IOP_MMHG: 13
OD_IOP_MMHG: 14

## 2018-01-15 ASSESSMENT — CONF VISUAL FIELD
OD_NORMAL: 1
OS_NORMAL: 1
METHOD: COUNTING FINGERS

## 2018-01-15 ASSESSMENT — EXTERNAL EXAM - RIGHT EYE: OD_EXAM: NORMAL

## 2018-01-15 NOTE — PROGRESS NOTES
Assessment & Plan     Compa Pennington is a 37 year old male with the following diagnoses:   1. Optic neuritis, right    2. Subjective vision disturbance    3. Neuromyelitis optica (H)       History of neuromyelitis optica  Status-post multiple attacks of optic  Neuritis RIGHT eye.  Last attack was November 2014.  He developed worsening of his vision recently and was seen on January 3, 2017.  He was noted to have visual acuity 20/15 and hip but his visual field had worsened.  An MRI was performed and demonstrated enhancement of the right optic nerve.  I had an opportunity to review these images personally and the and I would agree with that interpretation.    On exam today his visual acuity is 20/20 in each eye.  His visual field shows stable findings from January 3, 2017 (worse than 2015).  The rest of the examination is stable.    It is my impression that he has neuromyelitis optica with multiple attacks of optic neuritis in the right eye only.  He has had a new attack.  Of asked him to contact his neurologist and he has an appointment at [facility] in March 2018.  At this point in time think we can keep him off of oral prednisone.  He is to contact me if he develops any worsening of his vision.  If he does before his March 2018 appointment, I will give him another round of IV steroids and then put him on oral steroids.         Attending Physician Attestation:  Complete documentation of historical and exam elements from today's encounter can be found in the full encounter summary report (not reduplicated in this progress note).  I personally obtained the chief complaint(s) and history of present illness.  I confirmed and edited as necessary the review of systems, past medical/surgical history, family history, social history, and examination findings as documented by others; and I examined the patient myself.  I personally reviewed the relevant tests, images, and reports as documented above.  I formulated and  edited as necessary the assessment and plan and discussed the findings and management plan with the patient and family. - Cesario Pappas MD

## 2018-01-15 NOTE — NURSING NOTE
Chief Complaints and History of Present Illnesses   Patient presents with     Follow Up For     optic neuritis     HPI    Symptoms:              Comments:  Last seen in 2015.   Patient states on 1/6/2017, vision in RIGHT eye was decreasing. Patient felt it may have been a week before but certainly vision worse RIGHT eye on Saturday 1/6/2018.  One dose of solumedrol given in the ED and an additional 4 doses needed (5 total) last week. Order from Dr. Bergeron and Dr. Rose.    Patient states vision in RIGHT eye is not improving.   Denies headaches.   TIN Monroy 1/15/2018 2:07 PM

## 2018-03-06 ENCOUNTER — TELEPHONE (OUTPATIENT)
Dept: OPHTHALMOLOGY | Facility: CLINIC | Age: 38
End: 2018-03-06

## 2018-03-06 NOTE — TELEPHONE ENCOUNTER
I received a message today that the pt was requesting we send over a copy of his MRI to [facility]. Pt was not sure who ordered it but knew it was done at the [facility]. I told pt he needed to call the film room to request that or have [facility] request it. Gave pt the number. He was ok with this.  Anu Hi COA 2:34 PM March 6, 2018

## 2018-04-16 DIAGNOSIS — H46.9 OPTIC NEURITIS, RIGHT: Primary | ICD-10-CM

## 2018-04-17 ENCOUNTER — OFFICE VISIT (OUTPATIENT)
Dept: OPHTHALMOLOGY | Facility: CLINIC | Age: 38
End: 2018-04-17
Attending: OPHTHALMOLOGY
Payer: COMMERCIAL

## 2018-04-17 DIAGNOSIS — H46.9 OPTIC NEURITIS, RIGHT: ICD-10-CM

## 2018-04-17 DIAGNOSIS — H53.10 SUBJECTIVE VISION DISTURBANCE: ICD-10-CM

## 2018-04-17 PROCEDURE — 92083 EXTENDED VISUAL FIELD XM: CPT | Mod: ZF | Performed by: OPHTHALMOLOGY

## 2018-04-17 PROCEDURE — 92133 CPTRZD OPH DX IMG PST SGM ON: CPT | Mod: ZF | Performed by: OPHTHALMOLOGY

## 2018-04-17 PROCEDURE — 92015 DETERMINE REFRACTIVE STATE: CPT | Mod: ZF | Performed by: TECHNICIAN/TECHNOLOGIST

## 2018-04-17 PROCEDURE — G0463 HOSPITAL OUTPT CLINIC VISIT: HCPCS | Mod: 25,ZF | Performed by: TECHNICIAN/TECHNOLOGIST

## 2018-04-17 ASSESSMENT — REFRACTION_WEARINGRX
OS_AXIS: 105
SPECS_TYPE: SVL
OD_SPHERE: -2.00
OS_SPHERE: -3.00
OS_CYLINDER: +2.25
OD_AXIS: 055
OD_CYLINDER: +1.00

## 2018-04-17 ASSESSMENT — REFRACTION_MANIFEST
OS_AXIS: 105
OD_SPHERE: -2.25
OS_CYLINDER: +2.50
OD_AXIS: 050
OD_CYLINDER: +1.50
OS_SPHERE: -3.50

## 2018-04-17 ASSESSMENT — EXTERNAL EXAM - RIGHT EYE: OD_EXAM: NORMAL

## 2018-04-17 ASSESSMENT — CUP TO DISC RATIO
OD_RATIO: 0.2
OS_RATIO: 0.2

## 2018-04-17 ASSESSMENT — EXTERNAL EXAM - LEFT EYE: OS_EXAM: NORMAL

## 2018-04-17 ASSESSMENT — CONF VISUAL FIELD
OS_NORMAL: 1
METHOD: COUNTING FINGERS
OD_NORMAL: 1

## 2018-04-17 ASSESSMENT — VISUAL ACUITY
OD_CC: 20/20
METHOD: SNELLEN - LINEAR
OS_CC: 20/20
CORRECTION_TYPE: GLASSES

## 2018-04-17 ASSESSMENT — TONOMETRY
OD_IOP_MMHG: 16
IOP_METHOD: ICARE
OS_IOP_MMHG: 15

## 2018-04-17 ASSESSMENT — SLIT LAMP EXAM - LIDS
COMMENTS: NORMAL
COMMENTS: NORMAL

## 2018-04-17 NOTE — NURSING NOTE
Chief Complaints and History of Present Illnesses   Patient presents with     Follow Up For     optic neuritis     HPI    Symptoms:              Comments:  Compa Pennington is a 38 y/o M, 3 months follow up for optic neuritis.     Requesting for a new MRx.  Patient states no new changes in vision.   Denies headaches.     TIN Monroy 4/17/2018 2:51 PM

## 2018-04-17 NOTE — MR AVS SNAPSHOT
After Visit Summary   2018    Compa Pennington    MRN: [id number]           Patient Information     Date Of Birth          1980        Visit Information        Provider Department      2018 2:30 PM Cesario Pappas MD [facility]        Today's Diagnoses     Subjective vision disturbance        Optic neuritis, right           Follow-ups after your visit        Who to contact     Please call your clinic at [phone] to:    Ask questions about your health    Make or cancel appointments    Discuss your medicines    Learn about your test results    Speak to your doctor            Additional Information About Your Visit        MyChart Information     [employer] is an electronic gateway that provides easy, online access to your medical records. With [employer], you can request a clinic appointment, read your test results, renew a prescription or communicate with your care team.     To sign up for [employer] visit the website at [email or web address]   You will be asked to enter the access code listed below, as well as some personal information. Please follow the directions to create your username and password.     Your access code is: [email or web address]  Expires: 2018  6:31 AM     Your access code will  in 90 days. If you need help or a new code, please contact your [facility] or call [phone] for assistance.        Care EveryWhere ID     This is your Care EveryWhere ID. This could be used by other organizations to access your [facility] medical records  [id number]         Blood Pressure from Last 3 Encounters:   18 131/74   01/10/18 118/70   18 119/66    Weight from Last 3 Encounters:   18 90.7 kg (200 lb)   14 96.6 kg (213 lb)              We Performed the Following     DILATED FUNDUS EXAM     Glaucoma Top OU     IOP Measurement     OCT Optic Nerve RNFL Spectralis OU (both eyes)        Primary Care Provider Office Phone # Fax  #    Isaiah BELKYS Flower [phone]       [facility] [address] [facility] [address]        Equal Access to Services     HUGO HARRY : Lupe teresa anaya kelli Soverona, waanayada luqyesenia, qachuyta kaalmada juli, luis e leijashalom feng. So [facility] [phone].    ATENCIÓN: Si habla español, tiene a mckeon disposición servicios gratuitos de asistencia lingüística. Llame al [phone].    We comply with applicable federal civil rights laws and [place] laws. We do not discriminate on the basis of race, color, national origin, age, disability, sex, sexual orientation, or gender identity.            Thank you!     Thank you for choosing EYE CLINIC  for your care. Our goal is always to provide you with excellent care. Hearing back from our patients is one way we can continue to improve our services. Please take a few minutes to complete the written survey that you may receive in the mail after your visit with us. Thank you!             Your Updated Medication List - Protect others around you: Learn how to safely use, store and throw away your medicines at www.disposemymeds.org.          This list is accurate as of 4/17/18  4:20 PM.  Always use your most recent med list.                   Brand Name Dispense Instructions for use Diagnosis    * AZATHIOPRINE PO      Take 50 mg by mouth 2 times daily 150 mg in the am, 100 mg in the PM.        * azaTHIOprine 50 MG tablet    IMURAN          CIALIS 20 MG tablet   Generic drug:  tadalafil      Take 20 mg by mouth        OXYBUTYNIN CHLORIDE ER PO      Take by mouth daily        sildenafil 50 MG tablet    VIAGRA     Use 1/2 - 2 tablets ( MG) at least 30 minutes prior to intercourse.        SULFAMETHOXAZOLE-TRIMETHOPRIM PO      Take by mouth daily Standard amount?        vitamin D3 1000 units Caps      Take 1,000 Units by mouth        * Notice:  This list has 2 medication(s) that are the same as other medications prescribed for you. Read  the directions carefully, and ask your doctor or other care provider to review them with you.

## 2018-04-17 NOTE — PROGRESS NOTES
Assessment & Plan     Compa Pennington is a 37 year old male with the following diagnoses:   1. Subjective vision disturbance    2. Optic neuritis, right       Patient here in follow up from January 2018 for neuromyelitis optica with multiple attacks of optic neuritis in the right eye only. He is NMO seronegative but MOG positive.      Visual field shows small amount of changes since 2015 and stable optical coherence tomography since 2015.  His visual field is stable from January 2018.  His visual acuity is stable as well.      If he suffers another attack, he is to call me and be seen quickly to consider intravenous steroids.             Attending Physician Attestation:  Complete documentation of historical and exam elements from today's encounter can be found in the full encounter summary report (not reduplicated in this progress note).  I personally obtained the chief complaint(s) and history of present illness.  I confirmed and edited as necessary the review of systems, past medical/surgical history, family history, social history, and examination findings as documented by others; and I examined the patient myself.  I personally reviewed the relevant tests, images, and reports as documented above.  I formulated and edited as necessary the assessment and plan and discussed the findings and management plan with the patient and family. - Cesario Pappas MD

## 2019-01-30 NOTE — MR AVS SNAPSHOT
After Visit Summary   1/15/2018    Compa Pennington    MRN: [id number]           Patient Information     Date Of Birth          1980        Visit Information        Provider Department      1/15/2018 2:00 PM Cesario Pappas MD [facility]        Today's Diagnoses     Optic neuritis, right    -  1    Subjective vision disturbance        Neuromyelitis optica (H)           Follow-ups after your visit        Follow-up notes from your care team     Return in about 3 months (around 4/15/2018) for Vision, tension color, RNFL, GTOP.      Your next 10 appointments already scheduled     2018  2:30 PM CDT   RETURN NEURO with Cesario Pappas MD   Eye Clinic ([facility])    [address]  [address]  [address]  [facility] [id number] [phone]              Who to contact     Please call your clinic at [phone] to:    Ask questions about your health    Make or cancel appointments    Discuss your medicines    Learn about your test results    Speak to your doctor   If you have compliments or concerns about an experience at your clinic, or if you wish to file a complaint, please contact [facility] Patient Relations at [phone] or email us at [email or web address]         Additional Information About Your Visit        MyChart Information     [employer] is an electronic gateway that provides easy, online access to your medical records. With [employer], you can request a clinic appointment, read your test results, renew a prescription or communicate with your care team.     To sign up for [employer] visit the website at [email or web address]   You will be asked to enter the access code listed below, as well as some personal information. Please follow the directions to create your username and password.     Your access code is: [email or web address]  Expires: 3/29/2018  6:31 AM     Your access code will  in 90 days. If you  Patient informed that lab is still in process. Hopefully results will be final by tomorrow morning. need help or a new code, please contact your [facility] or call [phone] for assistance.        Care EveryWhere ID     This is your Care EveryWhere ID. This could be used by other organizations to access your [facility] medical records  [id number]         Blood Pressure from Last 3 Encounters:   01/11/18 131/74   01/10/18 118/70   01/09/18 119/66    Weight from Last 3 Encounters:   01/06/18 90.7 kg (200 lb)   11/28/14 96.6 kg (213 lb)              We Performed the Following     Glaucoma Top OU     IOP Measurement        Primary Care Provider Office Phone # Fax #    Isaiah RIZVI Mundo [phone]       [facility] [address]  [facility] [address]        Equal Access to Services     HUGO HARRY : Hadii teresa ku hadasho Soomaali, waaxda luqadaha, qaybta kaalmada adeegyada, waxay idiin hayaan dain feng. So [facility] [phone].    ATENCIÓN: Si habla español, tiene a mckeon disposición servicios gratuitos de asistencia lingüística. Llame al [phone].    We comply with applicable federal civil rights laws and [place] laws. We do not discriminate on the basis of race, color, national origin, age, disability, sex, sexual orientation, or gender identity.            Thank you!     Thank you for choosing EYE CLINIC  for your care. Our goal is always to provide you with excellent care. Hearing back from our patients is one way we can continue to improve our services. Please take a few minutes to complete the written survey that you may receive in the mail after your visit with us. Thank you!             Your Updated Medication List - Protect others around you: Learn how to safely use, store and throw away your medicines at www.disposemymeds.org.          This list is accurate as of: 1/15/18  3:24 PM.  Always use your most recent med list.                   Brand Name Dispense Instructions for use Diagnosis    * AZATHIOPRINE PO      Take 50 mg by mouth 2 times  daily 150 mg in the am, 100 mg in the PM.        * azaTHIOprine 50 MG tablet    IMURAN          CIALIS 20 MG tablet   Generic drug:  tadalafil      Take 20 mg by mouth        OXYBUTYNIN CHLORIDE ER PO      Take by mouth daily        sildenafil 50 MG tablet    VIAGRA     Use 1/2 - 2 tablets ( MG) at least 30 minutes prior to intercourse.        SULFAMETHOXAZOLE-TRIMETHOPRIM PO      Take by mouth daily Standard amount?        vitamin D3 1000 UNITS Caps      Take 1,000 Units by mouth        * Notice:  This list has 2 medication(s) that are the same as other medications prescribed for you. Read the directions carefully, and ask your doctor or other care provider to review them with you.

## 2019-06-12 ENCOUNTER — OFFICE VISIT - HEALTHEAST (OUTPATIENT)
Dept: FAMILY MEDICINE | Facility: CLINIC | Age: 39
End: 2019-06-12

## 2019-06-12 DIAGNOSIS — Z76.89 ENCOUNTER TO ESTABLISH CARE: ICD-10-CM

## 2019-06-12 DIAGNOSIS — G36.0 NEUROMYELITIS OPTICA (H): ICD-10-CM

## 2019-06-12 DIAGNOSIS — L98.9 FACIAL LESION: ICD-10-CM

## 2019-06-12 LAB
ALBUMIN SERPL-MCNC: 4.2 G/DL (ref 3.5–5)
ALP SERPL-CCNC: 50 U/L (ref 45–120)
ALT SERPL W P-5'-P-CCNC: 11 U/L (ref 0–45)
ANION GAP SERPL CALCULATED.3IONS-SCNC: 5 MMOL/L (ref 5–18)
AST SERPL W P-5'-P-CCNC: 15 U/L (ref 0–40)
BASOPHILS # BLD AUTO: 0 THOU/UL (ref 0–0.2)
BASOPHILS NFR BLD AUTO: 0 % (ref 0–2)
BILIRUB SERPL-MCNC: 1.1 MG/DL (ref 0–1)
BUN SERPL-MCNC: 14 MG/DL (ref 8–22)
CALCIUM SERPL-MCNC: 9.9 MG/DL (ref 8.5–10.5)
CHLORIDE BLD-SCNC: 105 MMOL/L (ref 98–107)
CO2 SERPL-SCNC: 32 MMOL/L (ref 22–31)
CREAT SERPL-MCNC: 0.98 MG/DL (ref 0.7–1.3)
EOSINOPHIL # BLD AUTO: 0.1 THOU/UL (ref 0–0.4)
EOSINOPHIL NFR BLD AUTO: 5 % (ref 0–6)
ERYTHROCYTE [DISTWIDTH] IN BLOOD BY AUTOMATED COUNT: 12.8 % (ref 11–14.5)
GFR SERPL CREATININE-BSD FRML MDRD: >60 ML/MIN/1.73M2
GLUCOSE BLD-MCNC: 84 MG/DL (ref 70–125)
HCT VFR BLD AUTO: 43.5 % (ref 40–54)
HGB BLD-MCNC: 14.8 G/DL (ref 14–18)
LYMPHOCYTES # BLD AUTO: 0.8 THOU/UL (ref 0.8–4.4)
LYMPHOCYTES NFR BLD AUTO: 29 % (ref 20–40)
MCH RBC QN AUTO: 35.5 PG (ref 27–34)
MCHC RBC AUTO-ENTMCNC: 33.9 G/DL (ref 32–36)
MCV RBC AUTO: 105 FL (ref 80–100)
MONOCYTES # BLD AUTO: 0.2 THOU/UL (ref 0–0.9)
MONOCYTES NFR BLD AUTO: 7 % (ref 2–10)
NEUTROPHILS # BLD AUTO: 1.7 THOU/UL (ref 2–7.7)
NEUTROPHILS NFR BLD AUTO: 59 % (ref 50–70)
PLATELET # BLD AUTO: 153 THOU/UL (ref 140–440)
PMV BLD AUTO: 7.7 FL (ref 7–10)
POTASSIUM BLD-SCNC: 4.9 MMOL/L (ref 3.5–5)
PROT SERPL-MCNC: 6.4 G/DL (ref 6–8)
RBC # BLD AUTO: 4.15 MILL/UL (ref 4.4–6.2)
SODIUM SERPL-SCNC: 142 MMOL/L (ref 136–145)
WBC: 2.8 THOU/UL (ref 4–11)

## 2019-06-12 ASSESSMENT — MIFFLIN-ST. JEOR: SCORE: 2016.22

## 2021-05-29 NOTE — PROGRESS NOTES
Assessment/Plan:        1. Encounter to establish care    2. Neuromyelitis optica (H)  History of and being managed by neurology at the [facility]   Needs a referral ( due to new insurance )     Plan:   - Ambulatory referral to Neurology     - Comprehensive Metabolic Panel  - HM1 (CBC with Diff)    3. Facial lesion     Exam findings were discussed   Consider Tyson K vs BCC.    Follow up for a biopsy.      At the conclusion of the encounter the plan of care, disposition and all questions were answered and reviewed, and the patient acknowledged understanding and was involved in the decision making regarding the overall care plan.     30 minutes or greater were spent with the patient today with greater than 50% of the times spent in counseling and management of care.       Subjective:    Patient ID:   Compa Pennington is a 38 y.o. male new patient and here to establish care.     Issues to discuss today:   1- having a couple of spots on the face noted for the past few months.   States to having similar lesions on his face which was removed by his primary with no conclusive diagnosis.       2  Neuromyelitis optica   History of and being managed by his neurologist at the [facility].   On Azathioprine  Needs a referral to the neurologist due to new insurance.   No additional concerns on this issue.            Review of Systems  Allergy: reviewed  General : negative  Ophthalmic : See HPI   ENT : negative  Respiratory : no cough, shortness of breath, or wheezing  Cardiovascular : no chest pain or dyspnea on exertion  Gastrointestinal : no abdominal pain, change in bowel habits, or black or bloody stools  Genito-Urinary :  no dysuria, trouble voiding, or hematuria  Dermatological : See HPI   Musculoskeletal : negative  Neurological : See HPI   Hematological and Lymphatic : negative  Endocrine : negative     The following patient's history were reviewed and updated as appropriate:   He  has no past medical history on file.  He   "has a past surgical history that includes No past surgeries.  His family history includes No Medical Problems in his brother, father, and mother.  He  reports that he has never smoked. He has never used smokeless tobacco. He reports that he does not drink alcohol or use drugs..      Outpatient Encounter Medications as of 6/12/2019   Medication Sig Dispense Refill     azaTHIOprine (IMURAN) 50 mg tablet TAKE 3 TABLETS BY MOUTH EVERY MORNING AND 2 TABLETS EVERY NIGHT AT BEDTIME.  1     epinastine (ELESTAT) 0.05 % ophthalmic solution Apply 1 drop to eye.       oxybutynin (DITROPAN XL) 10 MG ER tablet Take 10 mg by mouth daily.  3     sildenafil (VIAGRA) 50 MG tablet USE 1/2 - 2 TABLETS ( MG) AT LEAST 30 MINUTES PRIOR TO INTERCOURSE.  1     sulfamethoxazole-trimethoprim (SEPTRA) 400-80 mg per tablet Take 1 tablet by mouth daily.  1     sulfamethoxazole-trimethoprim (SEPTRA) 400-80 mg per tablet Take 1 tablet by mouth.       tadalafil (CIALIS) 20 MG tablet Take 20 mg by mouth.       azaTHIOprine (IMURAN) 50 mg tablet Take 50 mg by mouth. Take 50 mg by mouth 2 times daily 150 mg in the am, 100 mg in the PM.       oxybutynin (DITROPAN XL) 10 MG ER tablet Take 10 mg by mouth daily.       No facility-administered encounter medications on file as of 6/12/2019.          Objective:   /78 (Patient Site: Right Arm, Patient Position: Sitting, Cuff Size: Adult Large)   Pulse (!) 54   Ht 6' 3\" (1.905 m)   Wt (!) 225 lb (102.1 kg)   SpO2 99%   BMI 28.12 kg/m        Physical Exam  General Appearance:    Alert, well hydrated, no distress,    Eyes:    PERRL, conjunctiva/corneas clear,    Throat:   Lips, mucosa, and tongue normal; teeth and gums normal   Neck:   Supple, symmetrical, trachea midline, no adenopathy;        thyroid:  No enlargement/tenderness/nodules; no carotid    bruit or JVD   Lungs:     Clear to auscultation bilaterally, respirations unlabored   Heart:    Regular rate and rhythm, S1 and S2 normal, no " murmur, rub   or gallop   Abdomen:     Soft, non-tender, normal bowel sounds, no rebound or guarding, no masses, no organomegaly   Extremities:   Extremities normal, atraumatic, no cyanosis or edema   Skin:   Couple of tiny seborrhic like lesion on the facial skin ( one on the forehead and the other on the left cheek ) Skin color, texture, turgor normal, no other rashes or lesions

## 2021-06-03 VITALS — BODY MASS INDEX: 27.98 KG/M2 | HEIGHT: 75 IN | WEIGHT: 225 LBS

## 2021-07-15 ENCOUNTER — HOSPITAL ENCOUNTER (EMERGENCY)
Facility: HOSPITAL | Age: 41
Discharge: HOME | End: 2021-07-15
Attending: STUDENT IN AN ORGANIZED HEALTH CARE EDUCATION/TRAINING PROGRAM | Admitting: STUDENT IN AN ORGANIZED HEALTH CARE EDUCATION/TRAINING PROGRAM
Payer: COMMERCIAL

## 2021-07-15 VITALS
DIASTOLIC BLOOD PRESSURE: 73 MMHG | TEMPERATURE: 98.4 F | SYSTOLIC BLOOD PRESSURE: 120 MMHG | RESPIRATION RATE: 16 BRPM | BODY MASS INDEX: 28.12 KG/M2 | HEART RATE: 68 BPM | WEIGHT: 225 LBS | OXYGEN SATURATION: 98 %

## 2021-07-15 DIAGNOSIS — B34.9 VIRAL SYNDROME: ICD-10-CM

## 2021-07-15 PROCEDURE — 99282 EMERGENCY DEPT VISIT SF MDM: CPT

## 2021-07-15 NOTE — ED TRIAGE NOTES
Patient presents here with subjective fever, stiff neck and headache. The neck stiffness has occurred over the past 24 hours. Other symptoms have occurred over the past week. He notes that he had cold symptoms about 10 days ago. He was seen at urgent care on 7/10 with a workup, including Covid 19, which was negative at that time. He was also screened for lymes disease and blood cultures, and  Mono.

## 2021-07-15 NOTE — ED PROVIDER NOTES
Emergency Department Encounter       CHIEF COMPLAINT:    Headache and Viral Symptoms          Impression and Plan     ED COURSE & MEDICAL DECISION MAKING:  ED Course as of Jul 15 1055   Thu Jul 15, 2021   1015 Patient is a 40-year-old male with history of Devic's disease, on azathioprine, here with 7 days of fever and approximately 10 days of feeling unwell.  Patient was seen on Saturday in urgent care and had a chest x-ray, Covid swab that was negative.  Was then seen on Tuesday by his primary care office that had a Monospot and Lyme titer both negative.  Patient presents because of some neck stiffness that began last night as well as this morning.  Mild headache.  Mild malaise and fatigue.  No vomiting or photophobia.  States his neck stiffness is gotten better since he has been up and moving around.  No abdominal pain.  No chest pain or cough.  No rashes.  Normal urination.  Normal bowel movements.  No leg swelling.  Patient reports that his 3 children as well as his wife and father all had similar viral symptoms over the past week or so.  States him and his father the only ones it spiked fevers.  Arrival he looks well.  No fever.  Did not take any medicines this morning.  No tachycardia.  He looks well clinically.  Alert oriented x4.  Heart and lungs normal.  HEENT examination normal.  Patient has full range of motion of his neck with no meningismus.  No photophobia.  Making good eye contact.  Abdomen is benign.  No rash appreciated.  No leg swelling.  Extensive bedside discussion with patient regarding disposition.  We discussed that there is a chance although very small that he does have meningitis.  Unlikely bacterial meningitis considering patient does not look toxic.  Could be a very mild viral meningitis.  Patient states has had multiple lumbar punctures because of his Devic's disease and would rather not get a lumbar puncture if he does not need one.  I think this is reasonable.      1017 With no  persisting neck stiffness/any signs of meningeal irritation here, photophobia, vomiting, inappropriate tachycardia, fever I think this is reasonable.  We did discuss patient is at risk for a bacterial process because he is on azathioprine however at this point I think a conservative approach of ibuprofen, Tylenol, fluids and close follow-up is reasonable.  Without cough or congestion unlikely pneumonia.  His blood cultures are pending per his primary care.  Patient states he has been off his azathioprine for 1 week because of his viral syndrome.  I suspect he is having lingering viral symptoms because of his previous immunosuppressed state.  Neurologically is grossly intact unlikely needing any sort of central process imaging.  Offered blood work however patient declined I think this is reasonable.  I think at this point the main question was LP versus no LP.  In my opinion, I think the risks versus benefits are outweighed by the risks including LP headache, introducing bacteria into the spinal column, and pain.  Patient agrees.  Is answered.  Patient to be discharged home in stable condition.        - patient seems reliable and we discussed return precautions.       9:23 AM Attempted to see patient but he were not in their room.  9:43 AM Attempted to see patient but he were not in their room.  9:55 AM I met with the patient to gather history and to perform my initial exam. I discussed the plan for care while in the Emergency Department. PPE (gloves and surgical mask) was worn by me during patient encounters while patient wore mask.   10:15 AM We discussed plans for discharge and patient is agreeable.      FINAL IMPRESSION:  viral syndrome          At the conclusion of the encounter I discussed the results of all the tests and the disposition. The questions were answered. The patient or family acknowledged understanding and was agreeable with the care plan.      MEDICATIONS GIVEN IN THE EMERGENCY  DEPARTMENT:  Medications - No data to display    NEW PRESCRIPTIONS STARTED AT TODAY'S ED VISIT:  Discharge Medication List as of 7/15/2021 10:29 AM          HPI     Patient information obtained from: Patient    Use of Interpretor: N/A     Compa Pennington is a 40 year old male with a pertinent history of Devic's disease, optic neuritis who presents to this ED via walk in for evaluation of headache.     Per chart review, patient was seen at urgent clinic on 7/10/21 for fever. Chest x-ray was unremarkable. COVID negative, blood work negative, UA unremarkable, Lyme's negative, mono negative. Patient was seen in clinic on 7/13/21. CBC and heterophile negative.    Patient reports feeling unwell for 10 days and a fever for 7 days. Patient notes onset of neck stiffness last night that persisted into this morning. He endorses mild headache, mild malaise, and fatigue. Denies vomiting or photophobia. Neck stiffness has improved since moving around this morning. Patient notes his wife, children, and father have had similar viral symptoms over the past week. Patient denies chest pain, abdominal pain, cough, rashes, urinary symptoms, changes in bowel habits, leg swelling, or additional medical concerns or complaints at this time.     REVIEW OF SYSTEMS:  Review of Systems   Constitutional: Positive for fever, malaise, fatigue  HEENT: Positive for neck stiffness. Negative runny nose, sore throat, ear pain, neck pain  Eyes: Negative for photophobia.  Respiratory: Negative for shortness of breath, cough, congestion  Cardiovascular: Negative for chest pain, leg edema  Gastrointestinal: Negative for abdominal distention, abdominal pain, constipation, vomiting, nausea, diarrhea  Genitourinary: Negative for dysuria and hematuria.   Integument: Negative for rash, skin breakdown  Neurological: Positive for mild headache. Negative for paresthesias, weakness.  Musculoskeletal: Negative for joint pain, joint swelling      All other systems  reviewed and are negative.      Medical History     History reviewed. No pertinent past medical history.    Past Surgical History:   Procedure Laterality Date     NO PAST SURGERIES         Social History     Tobacco Use     Smoking status: Never Smoker     Smokeless tobacco: Never Used   Substance Use Topics     Alcohol use: Never     Drug use: Never       azaTHIOprine (IMURAN) 50 MG tablet  AZATHIOPRINE PO  Cholecalciferol (VITAMIN D3) 1000 UNITS CAPS  OXYBUTYNIN CHLORIDE ER PO  sildenafil (VIAGRA) 50 MG tablet  SULFAMETHOXAZOLE-TRIMETHOPRIM PO  tadalafil (CIALIS) 20 MG tablet            Physical Exam     /73   Pulse 68   Temp 98.4  F (36.9  C)   Resp 16   Wt 102.1 kg (225 lb)   SpO2 98%   BMI 28.12 kg/m        PHYSICAL EXAM:     General: Patient appears well, nontoxic, comfortable  HEENT: Moist mucous membranes, no tongue swelling.  No head trauma.  No midline neck pain. TM clear bilaterally. Throat without signs of exudate or asymmetry, no redness. Patient with negative brudzinski sign.  Cardiovascular: Normal rate, normal rhythm, no extremity edema.  No appreciable murmur.  Respiratory: No signs of respiratory distress, lungs are clear to auscultation bilaterally with no wheezes rhonchi or rales.  Abdominal: Soft, nontender, nondistended, no palpable masses, no guarding, no rebound  Musculoskeletal: Full range of motion of joints, no deformities appreciated.  Neurological: Alert and oriented, grossly neurologically intact.  Psychological: Normal affect and mood.  Integument: No rashes appreciated, no petechiae.       Results       Labs Ordered and Resulted from Time of ED Arrival Up to the Time of Departure from the ED - No data to display    No orders to display                 PROCEDURES:  Procedures:  Procedures       ISis am serving as a scribe to document services personally performed by Juan Rothman DO, based on my observations and the provider's statements to me.  IJuan DO,  attest that Sis Jacob is acting in a scribe capacity, has observed my performance of the services and has documented them in accordance with my direction.    Juan Rothman DO  Emergency Medicine  [facility] EMERGENCY DEPARTMENT     Ohl, Juan Pike DO  07/15/21 1109

## 2021-07-29 ENCOUNTER — HOSPITAL ENCOUNTER (EMERGENCY)
Facility: HOSPITAL | Age: 41
Discharge: HOME | End: 2021-07-29
Payer: COMMERCIAL

## 2021-07-29 VITALS
OXYGEN SATURATION: 97 % | BODY MASS INDEX: 27.35 KG/M2 | HEIGHT: 75 IN | HEART RATE: 112 BPM | SYSTOLIC BLOOD PRESSURE: 103 MMHG | TEMPERATURE: 101 F | RESPIRATION RATE: 16 BRPM | DIASTOLIC BLOOD PRESSURE: 62 MMHG | WEIGHT: 220 LBS

## 2021-07-29 ASSESSMENT — MIFFLIN-ST. JEOR: SCORE: 1993.54

## 2021-07-29 NOTE — ED TRIAGE NOTES
Pt has been having a low grade fever for the past 3 weeks. Yetsreday he was diagnosed with Jeff mountain spotted fever and was started on Doxycycline. Pt reports a fever of 103.7 at home this morning. Also reports headche and generalized body ache. Pt took 600 mg of Ibuprofen about an hour ago.

## 2023-11-29 ENCOUNTER — OFFICE VISIT (OUTPATIENT)
Dept: FAMILY MEDICINE | Facility: CLINIC | Age: 43
End: 2023-11-29
Payer: COMMERCIAL

## 2023-11-29 VITALS
DIASTOLIC BLOOD PRESSURE: 88 MMHG | OXYGEN SATURATION: 100 % | TEMPERATURE: 98.1 F | HEIGHT: 76 IN | RESPIRATION RATE: 16 BRPM | WEIGHT: 224 LBS | BODY MASS INDEX: 27.28 KG/M2 | HEART RATE: 56 BPM | SYSTOLIC BLOOD PRESSURE: 122 MMHG

## 2023-11-29 DIAGNOSIS — Z13.220 LIPID SCREENING: ICD-10-CM

## 2023-11-29 DIAGNOSIS — Z00.00 ANNUAL PHYSICAL EXAM: Primary | ICD-10-CM

## 2023-11-29 DIAGNOSIS — H46.9 RECURRENT OPTIC NEURITIS: ICD-10-CM

## 2023-11-29 DIAGNOSIS — R23.9 RECENT SKIN CHANGES: ICD-10-CM

## 2023-11-29 DIAGNOSIS — Z29.89 NEED FOR PROPHYLAXIS AGAINST URINARY TRACT INFECTION: ICD-10-CM

## 2023-11-29 DIAGNOSIS — Z11.4 SCREENING FOR HIV (HUMAN IMMUNODEFICIENCY VIRUS): ICD-10-CM

## 2023-11-29 DIAGNOSIS — N32.81 OVERACTIVE BLADDER: ICD-10-CM

## 2023-11-29 DIAGNOSIS — N52.9 ED (ERECTILE DYSFUNCTION) OF ORGANIC ORIGIN: ICD-10-CM

## 2023-11-29 DIAGNOSIS — Z23 IMMUNIZATION DUE: ICD-10-CM

## 2023-11-29 DIAGNOSIS — Z76.89 ENCOUNTER TO ESTABLISH CARE: ICD-10-CM

## 2023-11-29 DIAGNOSIS — Z11.59 NEED FOR HEPATITIS C SCREENING TEST: ICD-10-CM

## 2023-11-29 LAB
BASOPHILS # BLD AUTO: 0 10E3/UL (ref 0–0.2)
BASOPHILS NFR BLD AUTO: 1 %
EOSINOPHIL # BLD AUTO: 0.1 10E3/UL (ref 0–0.7)
EOSINOPHIL NFR BLD AUTO: 3 %
ERYTHROCYTE [DISTWIDTH] IN BLOOD BY AUTOMATED COUNT: 13.6 % (ref 10–15)
HCT VFR BLD AUTO: 45.4 % (ref 40–53)
HGB BLD-MCNC: 16.1 G/DL (ref 13.3–17.7)
IMM GRANULOCYTES # BLD: 0 10E3/UL
IMM GRANULOCYTES NFR BLD: 0 %
LYMPHOCYTES # BLD AUTO: 0.6 10E3/UL (ref 0.8–5.3)
LYMPHOCYTES NFR BLD AUTO: 21 %
MCH RBC QN AUTO: 33.8 PG (ref 26.5–33)
MCHC RBC AUTO-ENTMCNC: 35.5 G/DL (ref 31.5–36.5)
MCV RBC AUTO: 95 FL (ref 78–100)
MONOCYTES # BLD AUTO: 0.3 10E3/UL (ref 0–1.3)
MONOCYTES NFR BLD AUTO: 10 %
NEUTROPHILS # BLD AUTO: 1.9 10E3/UL (ref 1.6–8.3)
NEUTROPHILS NFR BLD AUTO: 66 %
PLATELET # BLD AUTO: 162 10E3/UL (ref 150–450)
RBC # BLD AUTO: 4.77 10E6/UL (ref 4.4–5.9)
WBC # BLD AUTO: 2.9 10E3/UL (ref 4–11)

## 2023-11-29 PROCEDURE — 87340 HEPATITIS B SURFACE AG IA: CPT | Performed by: FAMILY MEDICINE

## 2023-11-29 PROCEDURE — 91320 SARSCV2 VAC 30MCG TRS-SUC IM: CPT | Performed by: FAMILY MEDICINE

## 2023-11-29 PROCEDURE — 36415 COLL VENOUS BLD VENIPUNCTURE: CPT | Performed by: FAMILY MEDICINE

## 2023-11-29 PROCEDURE — 80053 COMPREHEN METABOLIC PANEL: CPT | Performed by: FAMILY MEDICINE

## 2023-11-29 PROCEDURE — 90686 IIV4 VACC NO PRSV 0.5 ML IM: CPT | Performed by: FAMILY MEDICINE

## 2023-11-29 PROCEDURE — 86706 HEP B SURFACE ANTIBODY: CPT | Performed by: FAMILY MEDICINE

## 2023-11-29 PROCEDURE — 87389 HIV-1 AG W/HIV-1&-2 AB AG IA: CPT | Performed by: FAMILY MEDICINE

## 2023-11-29 PROCEDURE — 90471 IMMUNIZATION ADMIN: CPT | Performed by: FAMILY MEDICINE

## 2023-11-29 PROCEDURE — 99386 PREV VISIT NEW AGE 40-64: CPT | Mod: 25 | Performed by: FAMILY MEDICINE

## 2023-11-29 PROCEDURE — 86803 HEPATITIS C AB TEST: CPT | Performed by: FAMILY MEDICINE

## 2023-11-29 PROCEDURE — 80061 LIPID PANEL: CPT | Performed by: FAMILY MEDICINE

## 2023-11-29 PROCEDURE — 99214 OFFICE O/P EST MOD 30 MIN: CPT | Mod: 25 | Performed by: FAMILY MEDICINE

## 2023-11-29 PROCEDURE — 90480 ADMN SARSCOV2 VAC 1/ONLY CMP: CPT | Performed by: FAMILY MEDICINE

## 2023-11-29 PROCEDURE — 85025 COMPLETE CBC W/AUTO DIFF WBC: CPT | Performed by: FAMILY MEDICINE

## 2023-11-29 RX ORDER — OXYBUTYNIN CHLORIDE 10 MG/1
1 TABLET, EXTENDED RELEASE ORAL
COMMUNITY
Start: 2023-10-04 | End: 2023-11-29

## 2023-11-29 RX ORDER — OXYBUTYNIN CHLORIDE 10 MG/1
10 TABLET, EXTENDED RELEASE ORAL
Qty: 90 TABLET | Refills: 3 | Status: SHIPPED | OUTPATIENT
Start: 2023-11-29

## 2023-11-29 RX ORDER — TADALAFIL 2.5 MG/1
2.5 TABLET ORAL DAILY
Qty: 90 TABLET | Refills: 3 | Status: SHIPPED | OUTPATIENT
Start: 2023-11-29 | End: 2024-07-19

## 2023-11-29 RX ORDER — SULFAMETHOXAZOLE AND TRIMETHOPRIM 400; 80 MG/1; MG/1
1 TABLET ORAL
Qty: 90 TABLET | Refills: 0 | Status: SHIPPED | OUTPATIENT
Start: 2023-11-29 | End: 2024-08-26

## 2023-11-29 RX ORDER — SULFAMETHOXAZOLE AND TRIMETHOPRIM 400; 80 MG/1; MG/1
1 TABLET ORAL
COMMUNITY
Start: 2023-10-12 | End: 2023-11-29

## 2023-11-29 RX ORDER — AZATHIOPRINE 50 MG/1
TABLET ORAL
Qty: 450 TABLET | Refills: 0 | Status: SHIPPED | OUTPATIENT
Start: 2023-11-29 | End: 2024-08-06

## 2023-11-29 NOTE — PROGRESS NOTES
SUBJECTIVE:   Compa is a 42 year old, presenting for the following:  Establish Care and Physical    Chief Complaints and History of Present Illnesses   Patient presents with    [facility] [place] Care    Physical            11/29/2023     2:28 PM   Additional Questions   Roomed by Chantel MCKINLEY CMA   Accompanied by Self       History of Present Illness       Reason for visit:  Establish care    He eats 0-1 servings of fruits and vegetables daily.He consumes 1 sweetened beverage(s) daily.He exercises with enough effort to increase his heart rate 10 to 19 minutes per day.  He exercises with enough effort to increase his heart rate 3 or less days per week.   He is taking medications regularly.    HEALTH MAINTENANCE:   - Hep B: unsure if previously done   - Covid: will do today   - Flu:will do today   - HIV: will screen, no risk factors    - Hep C: will screen, no risk factors      Was established at [facility], provider retired.     Neuromyelitis Optica started 2003 - started with transverse myelitis symptoms, resolved. Has recurrent optic neuritis. Is seeing [facility], neurology, clinic and Dr. Pappas neurophthalmologist at [facility]. Currently doing 150mg in the morning and 100mg in the evening of Imuran, 50mg tablets. Occasional flare.     Oxybutynin, bladder symptoms since original transverse myelitis.   Doing bactrim prophylaxis due to immunosuppressants. Was having some  issues with recurrent infections prior to starting.     Had bumps on hand, saw dermatology a while ago, biopsied.    Stills?     Today's PHQ-2 Score:       11/29/2023     2:13 PM   PHQ-2 ( 1999 Pf[employer])   Q1: Little interest or pleasure in doing things 0   Q2: Feeling down, depressed or hopeless 0   PHQ-2 Score 0   Q1: Little interest or pleasure in doing things Not at all   Q2: Feeling down, depressed or hopeless Not at all   PHQ-2 Score 0       Have you ever done Advance Care Planning? (For example, a Health Directive, POLST, or a discussion with a  "medical provider or your loved ones about your wishes): No, advance care planning information given to patient to review.  Patient declined advance care planning discussion at this time.    Social History     Tobacco Use    Smoking status: Never     Passive exposure: Never    Smokeless tobacco: Never   Substance Use Topics    Alcohol use: Never            No data to display                Last PSA: No results found for: \"PSA\"    Reviewed orders with patient. Reviewed health maintenance and updated orders accordingly - Yes      Reviewed and updated as needed this visit by clinical staff   Tobacco  Allergies  Meds              Reviewed and updated as needed this visit by Provider   Tobacco  Allergies  Meds  Problems  Med Hx  Surg Hx  Fam Hx           Review of Systems  10 point ROS negative except for as reported above.     OBJECTIVE:   /88 (BP Location: Left arm, Patient Position: Sitting, Cuff Size: Adult Regular)   Pulse 56   Temp 98.1  F (36.7  C) (Oral)   Resp 16   Ht 1.924 m (6' 3.75\")   Wt 101.6 kg (224 lb)   SpO2 100%   BMI 27.45 kg/m      Physical Exam  GENERAL: healthy, alert and no distress  EYES: Eyes grossly normal to inspection, PERRL and conjunctivae and sclerae normal  HENT: ear canals and TM's normal, nose and mouth without ulcers or lesions  NECK: no adenopathy, no asymmetry, masses, or scars and thyroid normal to palpation  RESP: lungs clear to auscultation - no rales, rhonchi or wheezes  CV: regular rate and rhythm, normal S1 S2, no S3 or S4, no murmur, click or rub, no peripheral edema and peripheral pulses strong  ABDOMEN: soft, nontender, no hepatosplenomegaly, no masses and bowel sounds normal  MS: no gross musculoskeletal defects noted, no edema  SKIN: no suspicious lesions or rashes  NEURO: Normal strength and tone, mentation intact and speech normal  PSYCH: mentation appears normal, affect normal/bright        ASSESSMENT/PLAN:     1. Annual physical exam  2. Encounter " to establish care  - REVIEW OF HEALTH MAINTENANCE PROTOCOL ORDERS    Reviewed health history and health maintenance recommendations.    3. ED (erectile dysfunction) of organic origin  - tadalafil (CIALIS) 2.5 MG tablet; Take 1 tablet (2.5 mg) by mouth daily  Dispense: 90 tablet; Refill: 3    Would like to try a lower dose of Cialis scheduled daily.  Prescription sent to pharmacy.  We can adjust as needed.    4. Recurrent optic neuritis  - azaTHIOprine (IMURAN) 50 MG tablet; Take 150mg in the morning and 100mg in the evening.  Dispense: 450 tablet; Refill: 0  - sulfamethoxazole-trimethoprim (BACTRIM) 400-80 MG tablet; Take 1 tablet by mouth daily at 2 pm  Dispense: 90 tablet; Refill: 0  - CBC with platelets and differential  - Comprehensive metabolic panel (BMP + Alb, Alk Phos, ALT, AST, Total. Bili, TP)    History of recurrent optic neuritis, prior episode of transverse myelitis.  States his old PCP was managing his Imuran.  He has seen specialists in neuro-ophthalmology at the  and a specialist at [place] though it has been a while.  His PCP had been managing his medications.  We will do monitoring labs today and send in a 90-day refill while connecting with specialist.  I do not manage this condition and recommend he see a specialist for medication management.    Sent a staff message to ophthalmology, Dr. Pappas, for clarification on who I should refer patient to for management.    5. Need for prophylaxis against urinary tract infection  - sulfamethoxazole-trimethoprim (BACTRIM) 400-80 MG tablet; Take 1 tablet by mouth daily at 2 pm  Dispense: 90 tablet; Refill: 0    Patient is on Imuran.  Taking UTI prophylaxis with Bactrim.  Will refill today while awaiting clarification on recommendations.    6. Recent skin changes  - Adult Dermatology  Referral; Future    Noticing bumps on the back of his hand, would like evaluation with dermatology.    7. Overactive bladder  - oxyBUTYnin ER (DITROPAN XL) 10 MG 24 hr  "tablet; Take 1 tablet (10 mg) by mouth daily at 2 pm  Dispense: 90 tablet; Refill: 3    Symptoms well controlled on oxybutynin.  We will continue current dose.    8. Lipid screening  - Lipid panel reflex to direct LDL Non-fasting    9. Need for hepatitis C screening test  - Hepatitis C Screen Reflex to HCV RNA Quant and Genotype    10. Screening for HIV (human immunodeficiency virus)  - HIV Antigen Antibody Combo    11. Immunization due  - INFLUENZA VACCINE IM > 6 MONTHS VALENT IIV4 (AFLURIA/FLUZONE)  - COVID-19 12+ (2023-24) (PFIZER)  - Hepatitis B Surface Antibody  - Hepatitis B surface antigen      Patient has been advised of split billing requirements and indicates understanding: Yes      COUNSELING:   Reviewed preventive health counseling, as reflected in patient instructions      BMI:   Estimated body mass index is 27.45 kg/m  as calculated from the following:    Height as of this encounter: 1.924 m (6' 3.75\").    Weight as of this encounter: 101.6 kg (224 lb).   Weight management plan: Discussed healthy diet and exercise guidelines      He reports that he has never smoked. He has never been exposed to tobacco smoke. He has never used smokeless tobacco.            Priya Hodges, [facility]  "

## 2023-11-30 LAB
ALBUMIN SERPL BCG-MCNC: 4.6 G/DL (ref 3.5–5.2)
ALP SERPL-CCNC: 51 U/L (ref 40–150)
ALT SERPL W P-5'-P-CCNC: 18 U/L (ref 0–70)
ANION GAP SERPL CALCULATED.3IONS-SCNC: 9 MMOL/L (ref 7–15)
AST SERPL W P-5'-P-CCNC: 18 U/L (ref 0–45)
BILIRUB SERPL-MCNC: 0.7 MG/DL
BUN SERPL-MCNC: 15.6 MG/DL (ref 6–20)
CALCIUM SERPL-MCNC: 9.6 MG/DL (ref 8.6–10)
CHLORIDE SERPL-SCNC: 104 MMOL/L (ref 98–107)
CHOLEST SERPL-MCNC: 218 MG/DL
CREAT SERPL-MCNC: 0.96 MG/DL (ref 0.67–1.17)
DEPRECATED HCO3 PLAS-SCNC: 30 MMOL/L (ref 22–29)
EGFRCR SERPLBLD CKD-EPI 2021: >90 ML/MIN/1.73M2
GLUCOSE SERPL-MCNC: 81 MG/DL (ref 70–99)
HBV SURFACE AB SERPL IA-ACNC: 0.09 M[IU]/ML
HBV SURFACE AB SERPL IA-ACNC: NONREACTIVE M[IU]/ML
HBV SURFACE AG SERPL QL IA: NONREACTIVE
HCV AB SERPL QL IA: NONREACTIVE
HDLC SERPL-MCNC: 71 MG/DL
HIV 1+2 AB+HIV1 P24 AG SERPL QL IA: NONREACTIVE
LDLC SERPL CALC-MCNC: 129 MG/DL
NONHDLC SERPL-MCNC: 147 MG/DL
POTASSIUM SERPL-SCNC: 4.7 MMOL/L (ref 3.4–5.3)
PROT SERPL-MCNC: 6.8 G/DL (ref 6.4–8.3)
SODIUM SERPL-SCNC: 143 MMOL/L (ref 135–145)
TRIGL SERPL-MCNC: 89 MG/DL

## 2023-11-30 NOTE — RESULT ENCOUNTER NOTE
The 10-year ASCVD risk score (Lobo GILLIS, et al., 2019) is: 1%  Patient updated by IZEA message with lab results.      Mark Anthony Chatman,  Your lab results have returned.  1.  Hepatitis C and HIV screening negative.  2.  You are not immune to the hepatitis B.  You could consider immunization with the series if desired.  This can be done at the pharmacy or a nurse only visit or a future appointment.  It is a 3 dose series.  3.  Your cholesterol labs are mildly elevated.  We no longer treat just to the numbers, rather your estimated risk of cardiovascular disease like a heart attack or stroke.  Your estimated risk is low, well below the treatment threshold.  Please continue working on a heart healthy nutrient dense diet, regular physical activity, and weight management to manage your cholesterol.  4.  Metabolic panel normal.  5.  CBC shows stable low white blood cell count.  I have sent a message to Dr. Pappas, ophthalmology to clarify our follow-up plans and management plans for the neuromyelitis optica.  I will let you know when I hear back.  Please reach out by IZEA with any follow-up questions or concerns.  Priya Hodges, DO

## 2023-12-06 NOTE — TELEPHONE ENCOUNTER
Action 12/6/23 MV 11.19am   Action Taken 1) called pt to obtain verbal consent from [facility] - pt provided consent - pulled records in [facility]. Asked if pt has been evaluated anywhere else outside of [facility] and pt stated no.  2) imaging request emailed to [facility]     Action 12/13/23 MV 2.46pm   Action Taken Images resolved in PACS       RECORDS RECEIVED FROM: internal   REASON FOR VISIT: Recurrent optic neuritis    Date of Appt: 1/2/24   NOTES (FOR ALL VISITS) STATUS DETAILS   OFFICE NOTE from referring provider Internal Dr Priya Hodges @ [facility]:  12/1/23 mychart encounter  11/29/23   OFFICE NOTE from other specialist Internal/[facility] Dr Jose Rodriguez @ [facility]:  1/9/20    Dr Rocco Pappas @ [facility]:  4/17/18  1/15/18  1/3/18  (Additional encounters)   DISCHARGE REPORT from the ER Internal [facility]:  1/6/18   MEDICATION LIST Internal    IMAGING  (FOR ALL VISITS)     MRI (HEAD, NECK, SPINE) PACS [facility]:  MRI Brain and Orbits 1/4/18    [facility]:  MRI Brain 8/11/14

## 2023-12-11 ENCOUNTER — OFFICE VISIT (OUTPATIENT)
Dept: DERMATOLOGY | Facility: CLINIC | Age: 43
End: 2023-12-11
Attending: FAMILY MEDICINE
Payer: COMMERCIAL

## 2023-12-11 DIAGNOSIS — L82.0 INFLAMED SEBORRHEIC KERATOSIS: Primary | ICD-10-CM

## 2023-12-11 PROCEDURE — 17111 DESTRUCTION B9 LESIONS 15/>: CPT | Performed by: PHYSICIAN ASSISTANT

## 2023-12-11 ASSESSMENT — PAIN SCALES - GENERAL: PAINLEVEL: NO PAIN (0)

## 2023-12-11 NOTE — PROGRESS NOTES
HPI:   Chief complaints: Compa Pennington is a pleasant 43 year old male who presents for evaluation of several spots of concern. He has raised spots on the back of his left hand, back and face. None are bleeding but they are irritated.       PHYSICAL EXAM:    There were no vitals taken for this visit.  Skin exam performed as follows: Type 2 skin. Mood appropriate  Alert and Oriented X 3. Well developed, well nourished in no distress.  General appearance: Normal  Head including face: Normal  Eyes: conjunctiva and lids: Normal  Mouth: Lips, teeth, gums: Normal  Neck: Normal  Skin: Scalp and body hair: See below    Inflamed keratotic papules on the left dorsal hand x 5, left cheek x 2, left forehead x 3, right forehead x 3, right inner cheek x 1, back x 4    ASSESSMENT/PLAN:     Inflamed seborrheic keratosis on the left dorsal hand x 5, left cheek x 2, left forehead x 3, right forehead x 3, right inner cheek x 1, back x 4. As physically tender cryosurgery performed. Advised on post op care.             Follow-up: PRN  CC:   Scribed By: Julia Rojas, MS, PA-C

## 2023-12-11 NOTE — LETTER
12/11/2023         RE: Compa Pennington  [address] [facility] [address]        Dear Colleague,    Thank you for referring your patient, Compa Pennington, to the [facility]. Please see a copy of my visit note below.    HPI:   Chief complaints: Compa Pennington is a pleasant 43 year old male who presents for evaluation of several spots of concern. He has raised spots on the back of his left hand, back and face. None are bleeding but they are irritated.       PHYSICAL EXAM:    There were no vitals taken for this visit.  Skin exam performed as follows: Type 2 skin. Mood appropriate  Alert and Oriented X 3. Well developed, well nourished in no distress.  General appearance: Normal  Head including face: Normal  Eyes: conjunctiva and lids: Normal  Mouth: Lips, teeth, gums: Normal  Neck: Normal  Skin: Scalp and body hair: See below    Inflamed keratotic papules on the left dorsal hand x 5, left cheek x 2, left forehead x 3, right forehead x 3, right inner cheek x 1, back x 4    ASSESSMENT/PLAN:     Inflamed seborrheic keratosis on the left dorsal hand x 5, left cheek x 2, left forehead x 3, right forehead x 3, right inner cheek x 1, back x 4. As physically tender cryosurgery performed. Advised on post op care.             Follow-up: PRN  CC:   Scribed By: Julia Rojas, MS, JOSÉ MIGUEL      Again, thank you for allowing me to participate in the care of your patient.        Sincerely,        Julia Rojas PA-C

## 2024-01-02 ENCOUNTER — LAB (OUTPATIENT)
Dept: LAB | Facility: CLINIC | Age: 44
End: 2024-01-02
Payer: COMMERCIAL

## 2024-01-02 ENCOUNTER — TELEPHONE (OUTPATIENT)
Dept: NEUROLOGY | Facility: CLINIC | Age: 44
End: 2024-01-02

## 2024-01-02 ENCOUNTER — OFFICE VISIT (OUTPATIENT)
Dept: NEUROLOGY | Facility: CLINIC | Age: 44
End: 2024-01-02
Attending: FAMILY MEDICINE
Payer: COMMERCIAL

## 2024-01-02 ENCOUNTER — PRE VISIT (OUTPATIENT)
Dept: NEUROLOGY | Facility: CLINIC | Age: 44
End: 2024-01-02
Payer: COMMERCIAL

## 2024-01-02 VITALS
DIASTOLIC BLOOD PRESSURE: 87 MMHG | OXYGEN SATURATION: 98 % | BODY MASS INDEX: 29.01 KG/M2 | HEART RATE: 55 BPM | HEIGHT: 75 IN | WEIGHT: 233.3 LBS | SYSTOLIC BLOOD PRESSURE: 130 MMHG

## 2024-01-02 DIAGNOSIS — G37.81 MOG ANTIBODY DISEASE (H): Primary | ICD-10-CM

## 2024-01-02 DIAGNOSIS — G37.81 MOG ANTIBODY DISEASE (H): ICD-10-CM

## 2024-01-02 DIAGNOSIS — H46.9 RECURRENT OPTIC NEURITIS: ICD-10-CM

## 2024-01-02 DIAGNOSIS — G36.0 NEUROMYELITIS OPTICA SPECTRUM DISORDER (H): Primary | ICD-10-CM

## 2024-01-02 LAB — HBV CORE AB SERPL QL IA: NONREACTIVE

## 2024-01-02 PROCEDURE — 99205 OFFICE O/P NEW HI 60 MIN: CPT | Performed by: PSYCHIATRY & NEUROLOGY

## 2024-01-02 PROCEDURE — 86704 HEP B CORE ANTIBODY TOTAL: CPT | Performed by: PSYCHIATRY & NEUROLOGY

## 2024-01-02 PROCEDURE — 82784 ASSAY IGA/IGD/IGG/IGM EACH: CPT | Performed by: PSYCHIATRY & NEUROLOGY

## 2024-01-02 PROCEDURE — 99000 SPECIMEN HANDLING OFFICE-LAB: CPT | Performed by: PATHOLOGY

## 2024-01-02 PROCEDURE — 99214 OFFICE O/P EST MOD 30 MIN: CPT | Performed by: PSYCHIATRY & NEUROLOGY

## 2024-01-02 PROCEDURE — 36415 COLL VENOUS BLD VENIPUNCTURE: CPT | Performed by: PATHOLOGY

## 2024-01-02 ASSESSMENT — PAIN SCALES - GENERAL: PAINLEVEL: NO PAIN (0)

## 2024-01-02 NOTE — NURSING NOTE
Chief Complaint   Patient presents with    MS    New Patient     Recurrent optic neuritis      Vitals were taken and medications were reconciled.   Jasper Olea, EMT  10:59 AM

## 2024-01-02 NOTE — TELEPHONE ENCOUNTER
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 Plan to infuse at [facility]. Baseline labs to be drawn today. Rituximab therapy plan routed to Dr Webb for signature.    Ana Bran RN

## 2024-01-02 NOTE — LETTER
1/2/2024       RE: Compa Pennington  [address]  [facility] [address]     Dear Colleague,    Thank you for referring your patient, Compa Pennington, to the [facility] MULTIPLE SCLEROSIS CLINIC [place] at [facility]. Please see a copy of my visit note below.    ID:  Compa Pennington is a 43-year-old man referred by Dr. 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    HPI: Copma developed longitudinally extensive transverse myelitis in 2003, with bilateral leg weakness.  He subsequently had multiple attacks of right optic neuritis.  He was diagnosed with neuromyelitis optica, when NMO IgG became available he was negative for that but subsequently tested positive for MOG antibodies.  In the current apartments, he has MOGAD.  He has followed at [facility], initially with Dr. Canela and more recently with Dr. Rodriguez prior to his pressure.  He has history of azathioprine, 250 mg/day in divided doses, he estimates since around 2007.  At least some of the attacks of right optic neuritis have occurred while he was taking azathioprine.  Last attack of the right neuritis was in January 2018, and I reviewed an MRI from that date which shows enhancing right optic nerve.  I asked him if treatment change was considered when he was having those relapses despite the azathioprine, and is not really clear to me why the treatment change was not done.  He feels is mainly because he has recovered well from postdialysis with treatment with steroids.    In terms of visual symptoms, he feels his vision in the right eye is not 100%.  He has some bladder urgency and occasional leaking as well as hesitancy and incomplete emptying.  He is on oxybutynin.  Sometimes has to use a Crede maneuver.  He has erectile dysfunction, uses tadalafil.  He notes that he seems to get lots of infections, including cold, shingles,  hand-foot-and-mouth disease.  He is on Bactrim prophylaxis.    Past Medical History:   Diagnosis Date    Febrile neutropenia     MOGAD    FH:  No neurologic disease.    SH:  , 3 kids, works as an  for the state.  Does not smoke or use alcohol.    Current Outpatient Medications:     azaTHIOprine (IMURAN) 50 MG tablet, Take 150mg in the morning and 100mg in the evening., Disp: 450 tablet, Rfl: 0    oxyBUTYnin ER (DITROPAN XL) 10 MG 24 hr tablet, Take 1 tablet (10 mg) by mouth daily at 2 pm, Disp: 90 tablet, Rfl: 3    sildenafil (VIAGRA) 50 MG tablet, , Disp: , Rfl:     sulfamethoxazole-trimethoprim (BACTRIM) 400-80 MG tablet, Take 1 tablet by mouth daily at 2 pm, Disp: 90 tablet, Rfl: 0    tadalafil (CIALIS) 2.5 MG tablet, Take 1 tablet (2.5 mg) by mouth daily, Disp: 90 tablet, Rfl: 3     Exam: He is alert and oriented.  Affect is bright and lung functions are normal.  Vision is 20/20 bilaterally.  He misses 1 color plate with the right eye.  There is a right afferent pupillary defect.  Remainder of cranial nerves are unremarkable.  Muscle bulk tone strength and dexterity are normal in the arms and legs.  Light touch is intact in all 4 limbs.  Reflexes are normal and symmetric.  Gait is narrow-based and stable with normal tandem walk and negative Romberg.    Review medical records including neurology oncology notes and MRI of the brain from January 2018.    Impression: MOGAD, clinically stable for the last 6 years on azathioprine but with multiple previous optic neuritis relapses on that medication.  I spent 63 minutes on his care on the date of service including chart review and face-to-face time.  We mainly discussed immunotherapy for MOGAD.  I told him that while I think this likely is a partly reasonable initial choice, I typically would change treatment to B-cell depletion in anyone who had a relapse while taking azathioprine.  We discussed the pros and cons of that and ultimately I  recommended changing treatment.  He agreed.    Plan: Hepatitis B panel and baseline immunoglobulins.  Rituximab 5 mg IV x 1.  Stop azathioprine after the infusion.  Follow-up in 6 months.    This note was completed in part using voice-recognition software, and some typographic errors may be present as a result.        Again, thank you for allowing me to participate in the care of your patient.      Sincerely,    Henry Webb MD

## 2024-01-03 LAB
IGA SERPL-MCNC: 55 MG/DL (ref 84–499)
IGG SERPL-MCNC: 706 MG/DL (ref 610–1616)
IGM SERPL-MCNC: 36 MG/DL (ref 35–242)

## 2024-01-03 RX ORDER — HEPARIN SODIUM,PORCINE 10 UNIT/ML
5-20 VIAL (ML) INTRAVENOUS DAILY PRN
Status: CANCELLED | OUTPATIENT
Start: 2024-01-03

## 2024-01-03 RX ORDER — DIPHENHYDRAMINE HCL 25 MG
50 CAPSULE ORAL ONCE
Status: CANCELLED
Start: 2024-01-03

## 2024-01-03 RX ORDER — METHYLPREDNISOLONE SODIUM SUCCINATE 125 MG/2ML
125 INJECTION, POWDER, LYOPHILIZED, FOR SOLUTION INTRAMUSCULAR; INTRAVENOUS
Status: CANCELLED
Start: 2024-01-03

## 2024-01-03 RX ORDER — METHYLPREDNISOLONE SODIUM SUCCINATE 125 MG/2ML
125 INJECTION, POWDER, LYOPHILIZED, FOR SOLUTION INTRAMUSCULAR; INTRAVENOUS ONCE
Status: CANCELLED | OUTPATIENT
Start: 2024-01-03

## 2024-01-03 RX ORDER — MEPERIDINE HYDROCHLORIDE 25 MG/ML
25 INJECTION INTRAMUSCULAR; INTRAVENOUS; SUBCUTANEOUS EVERY 30 MIN PRN
Status: CANCELLED | OUTPATIENT
Start: 2024-01-03

## 2024-01-03 RX ORDER — DIPHENHYDRAMINE HYDROCHLORIDE 50 MG/ML
50 INJECTION INTRAMUSCULAR; INTRAVENOUS
Status: CANCELLED
Start: 2024-01-03

## 2024-01-03 RX ORDER — EPINEPHRINE 1 MG/ML
0.3 INJECTION, SOLUTION, CONCENTRATE INTRAVENOUS EVERY 5 MIN PRN
Status: CANCELLED | OUTPATIENT
Start: 2024-01-03

## 2024-01-03 RX ORDER — ALBUTEROL SULFATE 90 UG/1
1-2 AEROSOL, METERED RESPIRATORY (INHALATION)
Status: CANCELLED
Start: 2024-01-03

## 2024-01-03 RX ORDER — HEPARIN SODIUM (PORCINE) LOCK FLUSH IV SOLN 100 UNIT/ML 100 UNIT/ML
5 SOLUTION INTRAVENOUS
Status: CANCELLED | OUTPATIENT
Start: 2024-01-03

## 2024-01-03 RX ORDER — ACETAMINOPHEN 325 MG/1
650 TABLET ORAL ONCE
Status: CANCELLED
Start: 2024-01-03

## 2024-01-03 RX ORDER — ALBUTEROL SULFATE 0.83 MG/ML
2.5 SOLUTION RESPIRATORY (INHALATION)
Status: CANCELLED | OUTPATIENT
Start: 2024-01-03

## 2024-01-07 NOTE — PROGRESS NOTES
ID:  Compa Pennington is a 43-year-old man referred by Dr. 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    HPI: Compa developed longitudinally extensive transverse myelitis in 2003, with bilateral leg weakness.  He subsequently had multiple attacks of right optic neuritis.  He was diagnosed with neuromyelitis optica, when NMO IgG became available he was negative for that but subsequently tested positive for MOG antibodies.  In the current parlance, he has MOGAD.  He has followed at [facility], initially with Dr. Canela and more recently with Dr. Rodriguez prior to his departure.  He has been taking azathioprine, 250 mg/day in divided doses, he estimates since around 2007.  At least some of the attacks of right optic neuritis have occurred while he was taking azathioprine.  Last attack of the right neuritis was in January 2018, and I reviewed an MRI from that date which shows enhancing right optic nerve.  I asked him if treatment change was considered when he was having those relapses despite the azathioprine, and is not really clear to me why the treatment change was not done.  He feels is mainly because he has recovered well from those attacks after treatment with steroids.    In terms of visual symptoms, he feels his vision in the right eye is not 100%.  He has some bladder urgency and occasional leaking as well as hesitancy and incomplete emptying.  He is on oxybutynin.  Sometimes has to use a Crede maneuver.  He has erectile dysfunction, uses tadalafil.  He notes that he seems to get lots of infections, including cold, shingles, hand-foot-and-mouth disease.  He is on Bactrim prophylaxis.    Past Medical History:   Diagnosis Date    Febrile neutropenia     MOGAD    FH:  No neurologic disease.    SH:  , 3 kids, works as an  for the [employer].  Does not smoke or use alcohol.    Current Outpatient Medications:     azaTHIOprine  (IMURAN) 50 MG tablet, Take 150mg in the morning and 100mg in the evening., Disp: 450 tablet, Rfl: 0    oxyBUTYnin ER (DITROPAN XL) 10 MG 24 hr tablet, Take 1 tablet (10 mg) by mouth daily at 2 pm, Disp: 90 tablet, Rfl: 3    sildenafil (VIAGRA) 50 MG tablet, , Disp: , Rfl:     sulfamethoxazole-trimethoprim (BACTRIM) 400-80 MG tablet, Take 1 tablet by mouth daily at 2 pm, Disp: 90 tablet, Rfl: 0    tadalafil (CIALIS) 2.5 MG tablet, Take 1 tablet (2.5 mg) by mouth daily, Disp: 90 tablet, Rfl: 3     Exam: He is alert and oriented.  Affect is bright and lung functions are normal.  Vision is 20/20 bilaterally.  He misses 1 color plate with the right eye.  There is a right afferent pupillary defect.  Remainder of cranial nerves are unremarkable.  Muscle bulk tone strength and dexterity are normal in the arms and legs.  Light touch is intact in all 4 limbs.  Reflexes are normal and symmetric.  Gait is narrow-based and stable with normal tandem walk and negative Romberg.    Review medical records including neurology oncology notes and MRI of the brain from January 2018.    Impression: MOGAD, clinically stable for the last 6 years on azathioprine but with multiple previous optic neuritis relapses on that medication.  I spent 63 minutes on his care on the date of service including chart review and face-to-face time.  We mainly discussed immunotherapy for MOGAD.  I told him that while I think this likely is a partly reasonable initial choice, I typically would change treatment to B-cell depletion in anyone who had a relapse while taking azathioprine.  We discussed the pros and cons of that and ultimately I recommended changing treatment.  He agreed.    Plan: Hepatitis B panel and baseline immunoglobulins.  Rituximab 5 mg IV x 1.  Stop azathioprine after the infusion.  Follow-up in 6 months.    This note was completed in part using voice-recognition software, and some typographic errors may be present as a result.

## 2024-01-12 NOTE — TELEPHONE ENCOUNTER
[employer]t message sent to pt advising him to schedule rituximab infusion. Phone number or [facility] infusion provided.     Ana Bran RN

## 2024-01-17 ENCOUNTER — OFFICE VISIT (OUTPATIENT)
Dept: FAMILY MEDICINE | Facility: CLINIC | Age: 44
End: 2024-01-17
Payer: COMMERCIAL

## 2024-01-17 VITALS
BODY MASS INDEX: 28.67 KG/M2 | OXYGEN SATURATION: 100 % | WEIGHT: 230.6 LBS | RESPIRATION RATE: 16 BRPM | HEIGHT: 75 IN | DIASTOLIC BLOOD PRESSURE: 86 MMHG | HEART RATE: 55 BPM | SYSTOLIC BLOOD PRESSURE: 119 MMHG

## 2024-01-17 DIAGNOSIS — N50.89 SCROTAL MASS: Primary | ICD-10-CM

## 2024-01-17 PROCEDURE — 99213 OFFICE O/P EST LOW 20 MIN: CPT | Performed by: FAMILY MEDICINE

## 2024-01-17 NOTE — PATIENT INSTRUCTIONS
Schedule testicular ultrasound due to a left scrotal mass ()possible sperm granuloma vs epididymal cyst)

## 2024-01-17 NOTE — PROGRESS NOTES
"      Chavo Chatman is a 43 year old, presenting for the following health issues:  Penis/Scrotum Problem (Pt did a testicle self exam and thought it was irregular, and thought he should be seen. NO pain or discomfort. )    Left testicle ? Lump felt about 2 weeks.      1/17/2024    10:36 AM   Additional Questions   Roomed by Sue Bridges CMA     History of Present Illness       Reason for visit:  [place]                    Objective    /86   Pulse 55   Resp 16   Ht 1.905 m (6' 3\")   Wt 104.6 kg (230 lb 9.6 oz)   SpO2 100%   BMI 28.82 kg/m    Body mass index is 28.82 kg/m .    Physical Exam   :  RIGHT TESTICLE NORMAL          LEFT TESTICLE, WITHOUT MASS OR NODULE, ADJACENT 1 CM SIZE SOFT TISSUE MASS INFERIOR AND MEDIAL TO THE LEFT TESTICLE WITHIN THE SCROTUM    Encounter Diagnosis   Name Primary?    Scrotal mass, Left, adjacent to left testicle Yes          PLAN:   Schedule testicular ultrasound due to a left scrotal mass ()possible sperm granuloma vs epididymal cyst)     Signed Electronically by: Kadeem Farley MD    "

## 2024-01-19 ENCOUNTER — HOSPITAL ENCOUNTER (OUTPATIENT)
Dept: ULTRASOUND IMAGING | Facility: HOSPITAL | Age: 44
Discharge: HOME | End: 2024-01-19
Attending: FAMILY MEDICINE | Admitting: FAMILY MEDICINE
Payer: COMMERCIAL

## 2024-01-19 DIAGNOSIS — N50.89 SCROTAL MASS: ICD-10-CM

## 2024-01-19 PROCEDURE — 76870 US EXAM SCROTUM: CPT

## 2024-02-28 NOTE — TELEPHONE ENCOUNTER
"Spoke with Compa to check in on plans for rituximab, since this has not yet been scheduled. Compa had looked into rituximab and had a question (regarding \"tell your doctor if you have\"), but could not in the moment remember the specific concern. [facility] if [facility] has [facility], and otherwise will call to schedule his infusion.     Ana Bran RN    "

## 2024-03-14 NOTE — TELEPHONE ENCOUNTER
Scheduled for rituximab on 4/10 at [facility]. Staff message sent to infusion finance.    Ana Bran RN

## 2024-03-15 PROBLEM — G36.0 NEUROMYELITIS OPTICA SPECTRUM DISORDER (H): Status: ACTIVE | Noted: 2024-03-15

## 2024-04-10 ENCOUNTER — INFUSION THERAPY VISIT (OUTPATIENT)
Dept: INFUSION THERAPY | Facility: HOSPITAL | Age: 44
End: 2024-04-10
Attending: PSYCHIATRY & NEUROLOGY
Payer: COMMERCIAL

## 2024-04-10 VITALS
OXYGEN SATURATION: 98 % | HEART RATE: 52 BPM | TEMPERATURE: 98.3 F | RESPIRATION RATE: 18 BRPM | DIASTOLIC BLOOD PRESSURE: 89 MMHG | SYSTOLIC BLOOD PRESSURE: 122 MMHG

## 2024-04-10 DIAGNOSIS — G36.0 NEUROMYELITIS OPTICA SPECTRUM DISORDER (H): Primary | ICD-10-CM

## 2024-04-10 PROCEDURE — 96415 CHEMO IV INFUSION ADDL HR: CPT

## 2024-04-10 PROCEDURE — 96413 CHEMO IV INFUSION 1 HR: CPT

## 2024-04-10 PROCEDURE — 250N000013 HC RX MED GY IP 250 OP 250 PS 637: Performed by: PSYCHIATRY & NEUROLOGY

## 2024-04-10 PROCEDURE — 250N000011 HC RX IP 250 OP 636: Performed by: PSYCHIATRY & NEUROLOGY

## 2024-04-10 PROCEDURE — 96375 TX/PRO/DX INJ NEW DRUG ADDON: CPT

## 2024-04-10 PROCEDURE — 258N000003 HC RX IP 258 OP 636: Performed by: PSYCHIATRY & NEUROLOGY

## 2024-04-10 PROCEDURE — 96376 TX/PRO/DX INJ SAME DRUG ADON: CPT

## 2024-04-10 RX ORDER — HEPARIN SODIUM (PORCINE) LOCK FLUSH IV SOLN 100 UNIT/ML 100 UNIT/ML
5 SOLUTION INTRAVENOUS
OUTPATIENT
Start: 2024-04-10

## 2024-04-10 RX ORDER — DIPHENHYDRAMINE HCL 50 MG
50 CAPSULE ORAL ONCE
Status: CANCELLED
Start: 2024-04-10

## 2024-04-10 RX ORDER — ALBUTEROL SULFATE 90 UG/1
1-2 AEROSOL, METERED RESPIRATORY (INHALATION)
Status: CANCELLED
Start: 2024-04-10

## 2024-04-10 RX ORDER — ALBUTEROL SULFATE 0.83 MG/ML
2.5 SOLUTION RESPIRATORY (INHALATION)
Status: CANCELLED | OUTPATIENT
Start: 2024-04-10

## 2024-04-10 RX ORDER — ALBUTEROL SULFATE 0.83 MG/ML
2.5 SOLUTION RESPIRATORY (INHALATION)
Status: DISCONTINUED | OUTPATIENT
Start: 2024-04-10 | End: 2024-04-10 | Stop reason: HOSPADM

## 2024-04-10 RX ORDER — DIPHENHYDRAMINE HCL 50 MG
50 CAPSULE ORAL ONCE
Status: COMPLETED | OUTPATIENT
Start: 2024-04-10 | End: 2024-04-10

## 2024-04-10 RX ORDER — ALBUTEROL SULFATE 90 UG/1
1-2 AEROSOL, METERED RESPIRATORY (INHALATION)
Status: DISCONTINUED | OUTPATIENT
Start: 2024-04-10 | End: 2024-04-10 | Stop reason: HOSPADM

## 2024-04-10 RX ORDER — METHYLPREDNISOLONE SODIUM SUCCINATE 125 MG/2ML
125 INJECTION, POWDER, LYOPHILIZED, FOR SOLUTION INTRAMUSCULAR; INTRAVENOUS
Status: CANCELLED
Start: 2024-04-10

## 2024-04-10 RX ORDER — METHYLPREDNISOLONE SODIUM SUCCINATE 125 MG/2ML
125 INJECTION, POWDER, LYOPHILIZED, FOR SOLUTION INTRAMUSCULAR; INTRAVENOUS
Status: DISCONTINUED | OUTPATIENT
Start: 2024-04-10 | End: 2024-04-10 | Stop reason: HOSPADM

## 2024-04-10 RX ORDER — METHYLPREDNISOLONE SODIUM SUCCINATE 125 MG/2ML
125 INJECTION, POWDER, LYOPHILIZED, FOR SOLUTION INTRAMUSCULAR; INTRAVENOUS ONCE
Status: CANCELLED | OUTPATIENT
Start: 2024-04-10

## 2024-04-10 RX ORDER — DIPHENHYDRAMINE HYDROCHLORIDE 50 MG/ML
50 INJECTION INTRAMUSCULAR; INTRAVENOUS
Status: COMPLETED | OUTPATIENT
Start: 2024-04-10 | End: 2024-04-10

## 2024-04-10 RX ORDER — DIPHENHYDRAMINE HYDROCHLORIDE 50 MG/ML
50 INJECTION INTRAMUSCULAR; INTRAVENOUS
Status: CANCELLED
Start: 2024-04-10

## 2024-04-10 RX ORDER — HEPARIN SODIUM,PORCINE 10 UNIT/ML
5-20 VIAL (ML) INTRAVENOUS DAILY PRN
OUTPATIENT
Start: 2024-04-10

## 2024-04-10 RX ORDER — EPINEPHRINE 1 MG/ML
0.3 INJECTION, SOLUTION INTRAMUSCULAR; SUBCUTANEOUS EVERY 5 MIN PRN
Status: DISCONTINUED | OUTPATIENT
Start: 2024-04-10 | End: 2024-04-10 | Stop reason: HOSPADM

## 2024-04-10 RX ORDER — EPINEPHRINE 1 MG/ML
0.3 INJECTION, SOLUTION INTRAMUSCULAR; SUBCUTANEOUS EVERY 5 MIN PRN
Status: CANCELLED | OUTPATIENT
Start: 2024-04-10

## 2024-04-10 RX ORDER — MEPERIDINE HYDROCHLORIDE 50 MG/ML
25 INJECTION INTRAMUSCULAR; INTRAVENOUS; SUBCUTANEOUS EVERY 30 MIN PRN
Status: DISCONTINUED | OUTPATIENT
Start: 2024-04-10 | End: 2024-04-10 | Stop reason: HOSPADM

## 2024-04-10 RX ORDER — ACETAMINOPHEN 325 MG/1
650 TABLET ORAL ONCE
Status: COMPLETED | OUTPATIENT
Start: 2024-04-10 | End: 2024-04-10

## 2024-04-10 RX ORDER — ACETAMINOPHEN 325 MG/1
650 TABLET ORAL ONCE
Status: CANCELLED
Start: 2024-04-10

## 2024-04-10 RX ORDER — MEPERIDINE HYDROCHLORIDE 50 MG/ML
25 INJECTION INTRAMUSCULAR; INTRAVENOUS; SUBCUTANEOUS EVERY 30 MIN PRN
Status: CANCELLED | OUTPATIENT
Start: 2024-04-10

## 2024-04-10 RX ORDER — METHYLPREDNISOLONE SODIUM SUCCINATE 125 MG/2ML
125 INJECTION, POWDER, LYOPHILIZED, FOR SOLUTION INTRAMUSCULAR; INTRAVENOUS ONCE
Status: COMPLETED | OUTPATIENT
Start: 2024-04-10 | End: 2024-04-10

## 2024-04-10 RX ADMIN — METHYLPREDNISOLONE SODIUM SUCCINATE 125 MG: 125 INJECTION, POWDER, FOR SOLUTION INTRAMUSCULAR; INTRAVENOUS at 08:07

## 2024-04-10 RX ADMIN — METHYLPREDNISOLONE SODIUM SUCCINATE 125 MG: 125 INJECTION, POWDER, FOR SOLUTION INTRAMUSCULAR; INTRAVENOUS at 10:04

## 2024-04-10 RX ADMIN — DIPHENHYDRAMINE HYDROCHLORIDE 50 MG: 50 CAPSULE ORAL at 08:07

## 2024-04-10 RX ADMIN — FAMOTIDINE 20 MG: 10 INJECTION INTRAVENOUS at 10:02

## 2024-04-10 RX ADMIN — DIPHENHYDRAMINE HYDROCHLORIDE 25 MG: 50 INJECTION, SOLUTION INTRAMUSCULAR; INTRAVENOUS at 10:03

## 2024-04-10 RX ADMIN — ACETAMINOPHEN 650 MG: 325 TABLET ORAL at 08:07

## 2024-04-10 RX ADMIN — RITUXIMAB-ABBS 500 MG: 10 INJECTION, SOLUTION INTRAVENOUS at 08:40

## 2024-04-10 RX ADMIN — SODIUM CHLORIDE 250 ML: 9 INJECTION, SOLUTION INTRAVENOUS at 08:03

## 2024-04-10 NOTE — PROGRESS NOTES
Infusion Nursing Note:  Compa Pennington presents today for First Dose Rituxan with per meds.    Patient seen by provider today: No   present during visit today: Not Applicable.    Note: Premeds Tylenol, Benadryl Solu medrol given, Rituxan started at 0845.  At 1000.He complained of a tickle and fullnesss in his throat.  Rituxan turned off.  Pepcid,solumedrol and benadryl given per MD order.  Symptoms resolved in 10 minutes.  Rituxan restarted at 1030.  HE tolerated remainder of treatment well, offers no further complaints.  [facility] [place]      Intravenous Access:  Peripheral IV placed.    Treatment Conditions:  Not Applicable.      Post Infusion Assessment:  Patient tolerated infusion without incident.  Blood return noted pre and post infusion.  Site patent and intact, free from redness, edema or discomfort.  No evidence of extravasations.  Access discontinued per protocol.       Discharge Plan:   Patient and/or family verbalized understanding of discharge instructions and all questions answered.      Janeth Turk RN

## 2024-08-06 ENCOUNTER — OFFICE VISIT (OUTPATIENT)
Dept: NEUROLOGY | Facility: CLINIC | Age: 44
End: 2024-08-06
Attending: PSYCHIATRY & NEUROLOGY
Payer: COMMERCIAL

## 2024-08-06 VITALS
DIASTOLIC BLOOD PRESSURE: 88 MMHG | SYSTOLIC BLOOD PRESSURE: 134 MMHG | BODY MASS INDEX: 29.87 KG/M2 | OXYGEN SATURATION: 96 % | HEART RATE: 52 BPM | WEIGHT: 239 LBS

## 2024-08-06 DIAGNOSIS — N31.9 NEUROGENIC BLADDER: ICD-10-CM

## 2024-08-06 DIAGNOSIS — G37.81 MOG ANTIBODY DISEASE (H): Primary | ICD-10-CM

## 2024-08-06 PROCEDURE — 99214 OFFICE O/P EST MOD 30 MIN: CPT | Performed by: PSYCHIATRY & NEUROLOGY

## 2024-08-06 PROCEDURE — G2211 COMPLEX E/M VISIT ADD ON: HCPCS | Performed by: PSYCHIATRY & NEUROLOGY

## 2024-08-06 PROCEDURE — 99213 OFFICE O/P EST LOW 20 MIN: CPT | Performed by: PSYCHIATRY & NEUROLOGY

## 2024-08-06 ASSESSMENT — PAIN SCALES - GENERAL: PAINLEVEL: NO PAIN (0)

## 2024-08-06 NOTE — NURSING NOTE
Chief Complaint   Patient presents with    MS    RECHECK     7 month follow up      Vitals were taken and medications were reconciled.  Jasper Olea, EMT  12:54 PM

## 2024-08-06 NOTE — Clinical Note
8/6/2024       RE: Compa Pennington  [address]  [facility] [address]     Dear Colleague,    Thank you for referring your patient, Compa Pennington, to the [facility] MULTIPLE SCLEROSIS CLINIC [place] at [facility]. Please see a copy of my visit note below.    ID:  Compa Pennington is a 43-year-old man who I have seen once previously in January for MOG antibody disease.  He returns for routine follow-up.    HPI: Compa has been clinically stable, with no new neurologic symptoms.  His residual symptoms include subtle decreased vision in the right eye, and bladder urgency hesitancy and leaking for which he wears a pad.  He had his initial rituximab infusion on 4/10/2024.  He was previously on azathioprine for many years, but had multiple relapses of optic neuritis on that, most recently in 2018.  He got some itchy throat with the infusion but responded to typical measures for infusion reactions.  His general medical health has been fine, although he notices that he has been gaining some weight.    Past Medical History:   Diagnosis Date    Febrile neutropenia  (H24)     MOGAD      Current Outpatient Medications:     oxyBUTYnin ER (DITROPAN XL) 10 MG 24 hr tablet, Take 1 tablet (10 mg) by mouth daily at 2 pm, Disp: 90 tablet, Rfl: 3    sulfamethoxazole-trimethoprim (BACTRIM) 400-80 MG tablet, Take 1 tablet by mouth daily at 2 pm, Disp: 90 tablet, Rfl: 0    tadalafil (CIALIS) 5 MG tablet, Take 1 tablet (5 mg) by mouth daily, Disp: 90 tablet, Rfl: 1    sildenafil (VIAGRA) 50 MG tablet, , Disp: , Rfl:      Exam: He is alert and oriented.  Affect is bright and language functions are normal.  Cranial nerves are notable only for a right afferent pupillary defect.  Muscle bulk tone and strength and dexterity are normal in the arms and legs.  Light touch is intact in all 4 limbs.  Finger tapping toe tapping and finger-nose-finger are normal.  Reflexes are normal and symmetric and  his gait is narrow based and stable.    Impression: MOG antibody disease (MOGAD), stable after starting rituximab.  I spent 28 minutes on his care on the date of service including chart review and face-to-face time.  We discussed the dosing of rituximab infusions which will be based on B-cell repopulation.  We will start checking the B-cell count in October.  I discussed the nature and timing of symptoms of disease relapse that he should report to me should they occur.    The longitudinal plan of care for the diagnosis(es)/condition(s) as documented were addressed during this visit. Due to the added complexity in care, I will continue to support Compa in the subsequent management and with ongoing continuity of care.    Plan: B-cell count in October of this year.  Follow-up in 1 year.    This note was completed in part using voice-recognition software, and some typographic errors may be present as a result.       Again, thank you for allowing me to participate in the care of your patient.      Sincerely,    Henry Webb MD

## 2024-08-09 PROBLEM — N31.9 NEUROGENIC BLADDER: Status: ACTIVE | Noted: 2024-08-09

## 2024-08-09 NOTE — PROGRESS NOTES
ID:  Compa Pennington is a 43-year-old man who I have seen once previously in January for MOG antibody disease.  He returns for routine follow-up.    HPI: Compa has been clinically stable, with no new neurologic symptoms.  His residual symptoms include subtle decreased vision in the right eye, and bladder urgency hesitancy and leaking for which he wears a pad.  He had his initial rituximab infusion on 4/10/2024.  He was previously on azathioprine for many years, but had multiple relapses of optic neuritis on that, most recently in 2018.  He got some itchy throat with the infusion but responded to typical measures for infusion reactions.  His general medical health has been fine, although he notices that he has been gaining some weight.    Past Medical History:   Diagnosis Date    Febrile neutropenia  (H24)     MOGAD      Current Outpatient Medications:     oxyBUTYnin ER (DITROPAN XL) 10 MG 24 hr tablet, Take 1 tablet (10 mg) by mouth daily at 2 pm, Disp: 90 tablet, Rfl: 3    sulfamethoxazole-trimethoprim (BACTRIM) 400-80 MG tablet, Take 1 tablet by mouth daily at 2 pm, Disp: 90 tablet, Rfl: 0    tadalafil (CIALIS) 5 MG tablet, Take 1 tablet (5 mg) by mouth daily, Disp: 90 tablet, Rfl: 1    sildenafil (VIAGRA) 50 MG tablet, , Disp: , Rfl:      Exam: He is alert and oriented.  Affect is bright and language functions are normal.  Cranial nerves are notable only for a right afferent pupillary defect.  Muscle bulk tone and strength and dexterity are normal in the arms and legs.  Light touch is intact in all 4 limbs.  Finger tapping toe tapping and finger-nose-finger are normal.  Reflexes are normal and symmetric and his gait is narrow based and stable.    Impression: MOG antibody disease (MOGAD), stable after starting rituximab.  I spent 28 minutes on his care on the date of service including chart review and face-to-face time.  We discussed the dosing of rituximab infusions which will be based on B-cell repopulation.  We  will start checking the B-cell count in October.  I discussed the nature and timing of symptoms of disease relapse that he should report to me should they occur.    The longitudinal plan of care for the diagnosis(es)/condition(s) as documented were addressed during this visit. Due to the added complexity in care, I will continue to support Compa in the subsequent management and with ongoing continuity of care.    Plan: B-cell count in October of this year.  Follow-up in 1 year.    This note was completed in part using voice-recognition software, and some typographic errors may be present as a result.

## 2024-08-26 DIAGNOSIS — H46.9 RECURRENT OPTIC NEURITIS: ICD-10-CM

## 2024-08-26 DIAGNOSIS — Z29.89 NEED FOR PROPHYLAXIS AGAINST URINARY TRACT INFECTION: ICD-10-CM

## 2024-08-26 RX ORDER — SULFAMETHOXAZOLE AND TRIMETHOPRIM 400; 80 MG/1; MG/1
1 TABLET ORAL
Qty: 90 TABLET | Refills: 1 | Status: SHIPPED | OUTPATIENT
Start: 2024-08-26

## 2024-09-02 ENCOUNTER — OFFICE VISIT (OUTPATIENT)
Dept: FAMILY MEDICINE | Facility: CLINIC | Age: 44
End: 2024-09-02
Payer: COMMERCIAL

## 2024-09-02 VITALS
DIASTOLIC BLOOD PRESSURE: 81 MMHG | TEMPERATURE: 98 F | SYSTOLIC BLOOD PRESSURE: 130 MMHG | HEART RATE: 52 BPM | OXYGEN SATURATION: 99 % | RESPIRATION RATE: 16 BRPM

## 2024-09-02 DIAGNOSIS — G37.81 MOG ANTIBODY DISEASE (H): ICD-10-CM

## 2024-09-02 DIAGNOSIS — B00.1 RECURRENT COLD SORES: ICD-10-CM

## 2024-09-02 DIAGNOSIS — D84.821 IMMUNOSUPPRESSION DUE TO DRUG THERAPY (H): ICD-10-CM

## 2024-09-02 DIAGNOSIS — B00.1 COLD SORE: Primary | ICD-10-CM

## 2024-09-02 DIAGNOSIS — G36.0 NEUROMYELITIS OPTICA SPECTRUM DISORDER (H): ICD-10-CM

## 2024-09-02 DIAGNOSIS — Z79.899 IMMUNOSUPPRESSION DUE TO DRUG THERAPY (H): ICD-10-CM

## 2024-09-02 PROCEDURE — 99214 OFFICE O/P EST MOD 30 MIN: CPT | Performed by: FAMILY MEDICINE

## 2024-09-02 RX ORDER — VALACYCLOVIR HYDROCHLORIDE 1 G/1
1000 TABLET, FILM COATED ORAL 2 TIMES DAILY
Qty: 20 TABLET | Refills: 0 | Status: SHIPPED | OUTPATIENT
Start: 2024-09-02 | End: 2024-09-12

## 2024-09-02 NOTE — PROGRESS NOTES
ASSESSMENT/PLAN:    No diagnosis found.    There are no Patient Instructions on file for this visit.    Reviewed medication instructions and side effects. Follow up if experiences side effects.     I reviewed supportive care, otc meds to use if needed, expected course, and signs of concern.  Follow up as needed or if he does not improve within  1-2 days or if worsens in any way.  Reviewed red flag symptoms and is to go to the ER if experiences any of these.     The use of Dragon/PowerMic dictation services may have been used to construct the content in this note; any grammatical or spelling errors are non-intentional. Please contact the author of this note directly if you are in need of any clarification.                Patient presents with:  Mouth/Lip Problem: Cold sore on bottom lip        Subjective     Compa Pennington is a 43 year old male who presents to clinic today for the following health issues:    HPI       Cold sore    Patient onset of cold sore right lower lip yesterday  Onset of cold sores since childhood.  Typically resolve on their own.  Patient  on a new immunosuppressive therapy.  With onset of cold sore yesterday, requesting antiviral treatment for his cold sore.  He has not had antiviral treatment no acyclovir or valacyclovir for his cold sores in the past  No fever, no mouth pain, no other lesions that he is noted for the 1 on the right lower lobe    Patient with diagnosis of MOG ( Myelin oligodendrocyte glycoprotein) antibody disease/Neuromyelitis optica spectrum disorder (H) diagnosed in 2005  On a new immunosuppressive therapy -rituximab infusions/azathioprine for many years prior to starting rituximab infusions 4/10/2024        Past Medical History:   Diagnosis Date    Febrile neutropenia  (H24)      Social History     Tobacco Use    Smoking status: Never     Passive exposure: Never    Smokeless tobacco: Never   Substance Use Topics    Alcohol use: Yes       Current Outpatient Medications    Medication Sig Dispense Refill    oxyBUTYnin ER (DITROPAN XL) 10 MG 24 hr tablet Take 1 tablet (10 mg) by mouth daily at 2 pm 90 tablet 3    sildenafil (VIAGRA) 50 MG tablet       sulfamethoxazole-trimethoprim (BACTRIM) 400-80 MG tablet TAKE 1 TABLET BY MOUTH DAILY AT 2 PM 90 tablet 1    tadalafil (CIALIS) 5 MG tablet Take 1 tablet (5 mg) by mouth daily 90 tablet 1     No Known Allergies          ROS are negative, except as otherwise noted HPI      Objective    /81   Pulse 52   Temp 98  F (36.7  C) (Oral)   Resp 16   SpO2 99%   There is no height or weight on file to calculate BMI.  Physical Exam   GENERAL: alert and no distress  EYES: Eyes grossly normal to inspection, PERRL and conjunctivae and sclerae normal  HENT: ear canals and TM's normal, nose and mouth -right lower lip 0.5 cm in diameter soft raised vesicular dark red lesion on right lower lip-no other lesions on the inner mucosa of the lip noted a lesion spreading to the skin of the face or chin, inner mucosa mild clear posterior pharynx clear  NECK: no adenopathy, no asymmetry, masses, or scars  NEURO: Normal strength and tone, mentation intact and speech normal      Diagnostic Test Results:  Labs reviewed in Epic  No results found for any visits on 09/02/24.

## 2024-09-02 NOTE — PATIENT INSTRUCTIONS
Valacyclovir 1000 mg 2 times a day for 10 days       if you still have symptoms after 10 days -please contact your primary care provider he will need to get a new prescription for more valacyclovir for another 5 to 7 days          Return to clinic or to ER if symptoms worsen     Follow up with your primary care provider or clinic in about 2-3 days  if your symptoms do not improve

## 2024-10-04 ENCOUNTER — LAB (OUTPATIENT)
Dept: LAB | Facility: CLINIC | Age: 44
End: 2024-10-04
Payer: COMMERCIAL

## 2024-10-04 DIAGNOSIS — G37.81 MOG ANTIBODY DISEASE (H): ICD-10-CM

## 2024-10-04 PROCEDURE — 86355 B CELLS TOTAL COUNT: CPT

## 2024-10-04 PROCEDURE — 36415 COLL VENOUS BLD VENIPUNCTURE: CPT

## 2024-10-05 LAB
CD19 B CELL COMMENT: ABNORMAL
CD19 CELLS # BLD: 15 CELLS/UL (ref 107–698)
CD19 CELLS NFR BLD: 2 % (ref 6–27)

## 2024-12-05 ENCOUNTER — OFFICE VISIT (OUTPATIENT)
Dept: FAMILY MEDICINE | Facility: CLINIC | Age: 44
End: 2024-12-05
Attending: FAMILY MEDICINE
Payer: COMMERCIAL

## 2024-12-05 VITALS
RESPIRATION RATE: 16 BRPM | HEIGHT: 75 IN | HEART RATE: 62 BPM | TEMPERATURE: 98.1 F | WEIGHT: 246 LBS | OXYGEN SATURATION: 99 % | SYSTOLIC BLOOD PRESSURE: 137 MMHG | DIASTOLIC BLOOD PRESSURE: 87 MMHG | BODY MASS INDEX: 30.59 KG/M2

## 2024-12-05 DIAGNOSIS — Z00.00 ANNUAL PHYSICAL EXAM: Primary | ICD-10-CM

## 2024-12-05 DIAGNOSIS — Z13.1 SCREENING FOR DIABETES MELLITUS: ICD-10-CM

## 2024-12-05 DIAGNOSIS — G35 MULTIPLE SCLEROSIS (H): ICD-10-CM

## 2024-12-05 DIAGNOSIS — H46.9 RECURRENT OPTIC NEURITIS: ICD-10-CM

## 2024-12-05 DIAGNOSIS — Z29.89 NEED FOR PROPHYLAXIS AGAINST URINARY TRACT INFECTION: ICD-10-CM

## 2024-12-05 DIAGNOSIS — Z23 IMMUNIZATION DUE: ICD-10-CM

## 2024-12-05 DIAGNOSIS — B00.1 RECURRENT COLD SORES: ICD-10-CM

## 2024-12-05 DIAGNOSIS — N32.81 OVERACTIVE BLADDER: ICD-10-CM

## 2024-12-05 DIAGNOSIS — D84.821 IMMUNOSUPPRESSION DUE TO DRUG THERAPY (H): ICD-10-CM

## 2024-12-05 DIAGNOSIS — G36.0 NEUROMYELITIS OPTICA SPECTRUM DISORDER (H): ICD-10-CM

## 2024-12-05 DIAGNOSIS — Z79.899 IMMUNOSUPPRESSION DUE TO DRUG THERAPY (H): ICD-10-CM

## 2024-12-05 DIAGNOSIS — Z13.220 LIPID SCREENING: ICD-10-CM

## 2024-12-05 DIAGNOSIS — N52.9 ED (ERECTILE DYSFUNCTION) OF ORGANIC ORIGIN: ICD-10-CM

## 2024-12-05 RX ORDER — TADALAFIL 5 MG/1
5 TABLET ORAL DAILY
Qty: 90 TABLET | Refills: 3 | Status: SHIPPED | OUTPATIENT
Start: 2024-12-05

## 2024-12-05 RX ORDER — OXYBUTYNIN CHLORIDE 10 MG/1
10 TABLET, EXTENDED RELEASE ORAL
Qty: 90 TABLET | Refills: 3 | Status: SHIPPED | OUTPATIENT
Start: 2024-12-05

## 2024-12-05 RX ORDER — VALACYCLOVIR HYDROCHLORIDE 1 G/1
1000 TABLET, FILM COATED ORAL 2 TIMES DAILY
Qty: 20 TABLET | Refills: 11 | Status: SHIPPED | OUTPATIENT
Start: 2024-12-05

## 2024-12-05 RX ORDER — SULFAMETHOXAZOLE AND TRIMETHOPRIM 400; 80 MG/1; MG/1
1 TABLET ORAL
Qty: 90 TABLET | Refills: 3 | Status: SHIPPED | OUTPATIENT
Start: 2024-12-05

## 2024-12-05 SDOH — HEALTH STABILITY: PHYSICAL HEALTH: ON AVERAGE, HOW MANY DAYS PER WEEK DO YOU ENGAGE IN MODERATE TO STRENUOUS EXERCISE (LIKE A BRISK WALK)?: 3 DAYS

## 2024-12-05 ASSESSMENT — SOCIAL DETERMINANTS OF HEALTH (SDOH): HOW OFTEN DO YOU GET TOGETHER WITH FRIENDS OR RELATIVES?: TWICE A WEEK

## 2024-12-05 NOTE — PROGRESS NOTES
Preventive Care Visit  [facility]  Priya Hodges DO, Family Medicine  Dec 5, 2024      Assessment & Plan     1. Annual physical exam (Primary)  - Adult Dermatology  Referral; Future    Reviewed health history and health maintenance recommendations.  Last year we checked hepatitis B antibodies and he is not immune.  Defer vaccine today given numerous other vaccines.  He can complete vaccine series at any point in time.  Recommend dermatology skin check, particularly in the setting of recurrent warts and immunosuppression.    2. Overactive bladder  - oxyBUTYnin ER (DITROPAN XL) 10 MG 24 hr tablet; Take 1 tablet (10 mg) by mouth daily at 2 pm.  Dispense: 90 tablet; Refill: 3    Symptoms adequately controlled with oxybutynin, continue current treatment regimen.    3. Need for prophylaxis against urinary tract infection  4. Recurrent optic neuritis  5. Neuromyelitis optica spectrum disorder (H)  6. Immunosuppression due to drug therapy (H)  - Adult Eye  Referral; Future  - sulfamethoxazole-trimethoprim (BACTRIM) 400-80 MG tablet; Take 1 tablet by mouth daily at 2 pm.  Dispense: 90 tablet; Refill: 3    Due to immunosuppression, continues on Bactrim prophylaxis.  Symptoms have been under excellent control with prophylaxis.    Continues to follow with neurology, would like to follow-up with neuro-ophthalmology, referral placed.  Currently on rituximab.  Is due for labs today, orders are in the chart.    7. ED (erectile dysfunction) of organic origin  - tadalafil (CIALIS) 5 MG tablet; Take 1 tablet (5 mg) by mouth daily.  Dispense: 90 tablet; Refill: 3    Continues on Cialis, adequately controlling symptoms.  Denies any side effects.    8. Recurrent cold sores  - valACYclovir (VALTREX) 1000 mg tablet; Take 1 tablet (1,000 mg) by mouth 2 times daily.  Dispense: 20 tablet; Refill: 11    At increased risk for cold sore breakthrough due to immunosuppression.  Continue Valtrex as  "needed. Episodes infrequent enough that he would continue with treatment as needed.    9. Lipid screening  - Lipid panel reflex to direct LDL Non-fasting; Future  - Comprehensive metabolic panel (BMP + Alb, Alk Phos, ALT, AST, Total. Bili, TP); Future    10. Screening for diabetes mellitus  - Comprehensive metabolic panel (BMP + Alb, Alk Phos, ALT, AST, Total. Bili, TP); Future    11. Immunization due  - TDAP 10-64Y (ADACEL,BOOSTRIX)  - INFLUENZA VACCINE,SPLIT VIRUS,TRIVALENT,PF(FLUZONE)  - COVID-19 12+ (PFIZER)  - Pneumococcal 20 Valent Conjugate (Prevnar 20)      Patient has been advised of split billing requirements and indicates understanding: Yes        BMI  Estimated body mass index is 30.75 kg/m  as calculated from the following:    Height as of this encounter: 1.905 m (6' 3\").    Weight as of this encounter: 111.6 kg (246 lb).   Weight management plan: Discussed healthy diet and exercise guidelines    Counseling  Appropriate preventive services were addressed with this patient via screening, questionnaire, or discussion as appropriate for fall prevention, nutrition, physical activity, Tobacco-use cessation, social engagement, weight loss and cognition.  Checklist reviewing preventive services available has been given to the patient.  Reviewed patient's diet, addressing concerns and/or questions.   He is at risk for lack of exercise and has been provided with information to increase physical activity for the benefit of his well-being.   The patient was instructed to see the dentist every 6 months.         Chavo Chatman is a 44 year old, presenting for the following:  Physical        12/5/2024     2:26 PM   Additional Questions   Roomed by Mahad CESAR MA          [facility]    Annual physical exam (Primary)  - flu: will do    - covid: will do    - TDaP: will do    - PCV: will do    - Hep B: not immune, defer today      Body mass index is 30.75 kg/m .  Wt Readings from Last 4 Encounters:   12/05/24 111.6 kg (246 lb) "   08/06/24 108.4 kg (239 lb)   01/17/24 104.6 kg (230 lb 9.6 oz)   01/02/24 105.8 kg (233 lb 4.8 oz)         The 10-year ASCVD risk score (Lobo GILLIS, et al., 2019) is: 1.5%    Values used to calculate the score:      Age: 44 years      Sex: Male      Is Non- : No      Diabetic: No      Tobacco smoker: No      Systolic Blood Pressure: 137 mmHg      Is BP treated: No      HDL Cholesterol: 71 mg/dL      Total Cholesterol: 218 mg/dL     Lab Results   Component Value Date    CHOL 218 11/29/2023     Lab Results   Component Value Date    HDL 71 11/29/2023     Lab Results   Component Value Date     11/29/2023     Lab Results   Component Value Date    TRIG 89 11/29/2023       Overactive bladder  Oxybutynin extended release 10 mg daily.    Need for prophylaxis against urinary tract infection  Recurrent optic neuritis  Immunosuppression due to drug therapy (H)  Neuromyelitis optica spectrum disorder (H)  Bactrim daily  Following with neurology, on rituximab.    ED (erectile dysfunction) of organic origin  Cialis. Dose effective.      Hx cold sore  Valtrex. Intermittent flares with immunosuppression.      Health Care Directive  Patient does not have a Health Care Directive: Discussed advance care planning with patient; however, patient declined at this time.        12/5/2024   General Health   How would you rate your overall physical health? Good   Feel stress (tense, anxious, or unable to sleep) Only a little      (!) STRESS CONCERN      12/5/2024   Nutrition   Three or more servings of calcium each day? Yes   Diet: Regular (no restrictions)   How many servings of fruit and vegetables per day? (!) 0-1   How many sweetened beverages each day? 0-1            12/5/2024   Exercise   Days per week of moderate/strenous exercise 3 days            12/5/2024   Social Factors   Frequency of gathering with friends or relatives Twice a week   Worry food won't last until get money to buy more No   Food not  last or not have enough money for food? No   Do you have housing? (Housing is defined as stable permanent housing and does not include staying ouside in a car, in a tent, in an abandoned building, in an overnight shelter, or couch-surfing.) Yes   Are you worried about losing your housing? No   Lack of transportation? No   Unable to get utilities (heat,electricity)? No            12/5/2024   Dental   Dentist two times every year? (!) NO            12/5/2024   TB Screening   Were you born outside of the [place]? No              Today's PHQ-2 Score:       8/6/2024    12:54 PM   PHQ-2 ( 1999 [employer])   Q1: Little interest or pleasure in doing things 0   Q2: Feeling down, depressed or hopeless 0   PHQ-2 Score 0         12/5/2024   Substance Use   Alcohol more than 3/day or more than 7/wk No   Do you use any other substances recreationally? No        Social History     Tobacco Use    Smoking status: Never     Passive exposure: Never    Smokeless tobacco: Never   Vaping Use    Vaping status: Never Used   Substance Use Topics    Alcohol use: Yes    Drug use: Never           12/5/2024   STI Screening   New sexual partner(s) since last STI/HIV test? No      ASCVD Risk   The 10-year ASCVD risk score (Lobo GILLIS, et al., 2019) is: 1.5%    Values used to calculate the score:      Age: 44 years      Sex: Male      Is Non- : No      Diabetic: No      Tobacco smoker: No      Systolic Blood Pressure: 137 mmHg      Is BP treated: No      HDL Cholesterol: 71 mg/dL      Total Cholesterol: 218 mg/dL        12/5/2024   Contraception/Family Planning   Questions about contraception or family planning No           Reviewed and updated as needed this visit by Provider   Tobacco  Allergies  Meds  Problems  Med Hx  Surg Hx  Fam Hx                Review of Systems  Constitutional, HEENT, cardiovascular, pulmonary, GI, , musculoskeletal, neuro, skin, endocrine and psych systems are negative, except as otherwise  "noted.     Objective    Exam    /87   Pulse 62   Temp 98.1  F (36.7  C) (Oral)   Resp 16   Ht 1.905 m (6' 3\")   Wt 111.6 kg (246 lb)   SpO2 99%   BMI 30.75 kg/m     Estimated body mass index is 30.75 kg/m  as calculated from the following:    Height as of this encounter: 1.905 m (6' 3\").    Weight as of this encounter: 111.6 kg (246 lb).    Physical Exam    GENERAL: alert and no distress  EYES: Eyes grossly normal to inspection, PERRL and conjunctivae and sclerae normal  HENT: ear canals and TM's normal, nose and mouth without ulcers or lesions  NECK: no adenopathy, no asymmetry, masses, or scars  RESP: lungs clear to auscultation - no rales, rhonchi or wheezes  CV: regular rate and rhythm, no murmurs   ABDOMEN: soft, nontender, no hepatosplenomegaly, no masses and bowel sounds normal  MS: no gross musculoskeletal defects noted, no edema  SKIN: multiple warts dorsal aspect of hands, many freckles and tan/brown macules.   NEURO: Normal strength and tone, mentation intact and speech normal  PSYCH: mentation appears normal, affect normal/bright        Signed Electronically by: Priya Hodges, DO    "

## 2024-12-05 NOTE — PATIENT INSTRUCTIONS
Patient Education   Preventive Care Advice   This is general advice given by our system to help you stay healthy. However, your care team may have specific advice just for you. Please talk to your care team about your preventive care needs.  Nutrition  Eat 5 or more servings of fruits and vegetables each day.  Try wheat bread, brown rice and whole grain pasta (instead of white bread, rice, and pasta).  Get enough calcium and vitamin D. Check the label on foods and aim for 100% of the RDA (recommended daily allowance).  Lifestyle  Exercise at least 150 minutes each week  (30 minutes a day, 5 days a week).  Do muscle strengthening activities 2 days a week. These help control your weight and prevent disease.  No smoking.  Wear sunscreen to prevent skin cancer.  Have a dental exam and cleaning every 6 months.  Yearly exams  See your health care team every year to talk about:  Any changes in your health.  Any medicines your care team has prescribed.  Preventive care, family planning, and ways to prevent chronic diseases.  Shots (vaccines)   HPV shots (up to age 26), if you've never had them before.  Hepatitis B shots (up to age 59), if you've never had them before.  COVID-19 shot: Get this shot when it's due.  Flu shot: Get a flu shot every year.  Tetanus shot: Get a tetanus shot every 10 years.  Pneumococcal, hepatitis A, and RSV shots: Ask your care team if you need these based on your risk.  Shingles shot (for age 50 and up)  General health tests  Diabetes screening:  Starting at age 35, Get screened for diabetes at least every 3 years.  If you are younger than age 35, ask your care team if you should be screened for diabetes.  Cholesterol test: At age 39, start having a cholesterol test every 5 years, or more often if advised.  Bone density scan (DEXA): At age 50, ask your care team if you should have this scan for osteoporosis (brittle bones).  Hepatitis C: Get tested at least once in your life.  STIs (sexually  transmitted infections)  Before age 24: Ask your care team if you should be screened for STIs.  After age 24: Get screened for STIs if you're at risk. You are at risk for STIs (including HIV) if:  You are sexually active with more than one person.  You don't use condoms every time.  You or a partner was diagnosed with a sexually transmitted infection.  If you are at risk for HIV, ask about PrEP medicine to prevent HIV.  Get tested for HIV at least once in your life, whether you are at risk for HIV or not.  Cancer screening tests  Cervical cancer screening: If you have a cervix, begin getting regular cervical cancer screening tests starting at age 21.  Breast cancer scan (mammogram): If you've ever had breasts, begin having regular mammograms starting at age 40. This is a scan to check for breast cancer.  Colon cancer screening: It is important to start screening for colon cancer at age 45.  Have a colonoscopy test every 10 years (or more often if you're at risk) Or, ask your provider about stool tests like a FIT test every year or Cologuard test every 3 years.  To learn more about your testing options, visit:   .  For help making a decision, visit:   https://bit.ly/ff81258.  Prostate cancer screening test: If you have a prostate, ask your care team if a prostate cancer screening test (PSA) at age 55 is right for you.  Lung cancer screening: If you are a current or former smoker ages 50 to 80, ask your care team if ongoing lung cancer screenings are right for you.  For informational purposes only. Not to replace the advice of your health care provider. Copyright   2023 [employer]. All rights reserved. Clinically reviewed by the [facility] Transitions Program. [employer] 430306 - REV 01/24.

## 2024-12-06 ENCOUNTER — TELEPHONE (OUTPATIENT)
Dept: NEUROLOGY | Facility: CLINIC | Age: 44
End: 2024-12-06

## 2024-12-06 DIAGNOSIS — G36.0 NEUROMYELITIS OPTICA SPECTRUM DISORDER (H): Primary | ICD-10-CM

## 2024-12-06 NOTE — TELEPHONE ENCOUNTER
Compa now due for next rituximab infusion. Orders pended to Dr. Webb. Once signed, Compa will be notified to schedule at [facility].    Willa Brown RN

## 2024-12-09 RX ORDER — ALBUTEROL SULFATE 0.83 MG/ML
2.5 SOLUTION RESPIRATORY (INHALATION)
OUTPATIENT
Start: 2024-12-09

## 2024-12-09 RX ORDER — ALBUTEROL SULFATE 90 UG/1
1-2 INHALANT RESPIRATORY (INHALATION)
Start: 2024-12-09

## 2024-12-09 RX ORDER — ACETAMINOPHEN 325 MG/1
650 TABLET ORAL ONCE
Start: 2024-12-09

## 2024-12-09 RX ORDER — DIPHENHYDRAMINE HYDROCHLORIDE 50 MG/ML
50 INJECTION INTRAMUSCULAR; INTRAVENOUS
Start: 2024-12-09

## 2024-12-09 RX ORDER — DIPHENHYDRAMINE HYDROCHLORIDE 50 MG/ML
25 INJECTION INTRAMUSCULAR; INTRAVENOUS
Start: 2024-12-09

## 2024-12-09 RX ORDER — METHYLPREDNISOLONE SODIUM SUCCINATE 125 MG/2ML
125 INJECTION INTRAMUSCULAR; INTRAVENOUS ONCE
OUTPATIENT
Start: 2024-12-09

## 2024-12-09 RX ORDER — EPINEPHRINE 1 MG/ML
0.3 INJECTION, SOLUTION, CONCENTRATE INTRAVENOUS EVERY 5 MIN PRN
OUTPATIENT
Start: 2024-12-09

## 2024-12-09 RX ORDER — DIPHENHYDRAMINE HCL 25 MG
50 CAPSULE ORAL ONCE
Start: 2024-12-09

## 2024-12-09 RX ORDER — HEPARIN SODIUM,PORCINE 10 UNIT/ML
5-20 VIAL (ML) INTRAVENOUS DAILY PRN
OUTPATIENT
Start: 2024-12-09

## 2024-12-09 RX ORDER — HEPARIN SODIUM (PORCINE) LOCK FLUSH IV SOLN 100 UNIT/ML 100 UNIT/ML
5 SOLUTION INTRAVENOUS
OUTPATIENT
Start: 2024-12-09

## 2024-12-09 RX ORDER — METHYLPREDNISOLONE SODIUM SUCCINATE 40 MG/ML
40 INJECTION INTRAMUSCULAR; INTRAVENOUS
Start: 2024-12-09

## 2024-12-11 NOTE — TELEPHONE ENCOUNTER
Left [facility] for Compa about scheduling his next infusion.    Willa Brown RN

## 2025-02-19 ENCOUNTER — TRANSFERRED RECORDS (OUTPATIENT)
Dept: HEALTH INFORMATION MANAGEMENT | Facility: CLINIC | Age: 45
End: 2025-02-19
Payer: COMMERCIAL

## 2025-02-20 ENCOUNTER — INFUSION THERAPY VISIT (OUTPATIENT)
Dept: INFUSION THERAPY | Facility: HOSPITAL | Age: 45
End: 2025-02-20
Attending: PSYCHIATRY & NEUROLOGY
Payer: COMMERCIAL

## 2025-02-20 VITALS
RESPIRATION RATE: 16 BRPM | TEMPERATURE: 97.9 F | SYSTOLIC BLOOD PRESSURE: 116 MMHG | HEART RATE: 60 BPM | DIASTOLIC BLOOD PRESSURE: 81 MMHG | OXYGEN SATURATION: 99 %

## 2025-02-20 DIAGNOSIS — G36.0 NEUROMYELITIS OPTICA SPECTRUM DISORDER (H): Primary | ICD-10-CM

## 2025-02-20 PROCEDURE — 250N000013 HC RX MED GY IP 250 OP 250 PS 637: Performed by: PSYCHIATRY & NEUROLOGY

## 2025-02-20 PROCEDURE — 258N000003 HC RX IP 258 OP 636: Performed by: PSYCHIATRY & NEUROLOGY

## 2025-02-20 PROCEDURE — 250N000011 HC RX IP 250 OP 636: Mod: JZ | Performed by: PSYCHIATRY & NEUROLOGY

## 2025-02-20 RX ORDER — EPINEPHRINE 1 MG/ML
0.3 INJECTION, SOLUTION INTRAMUSCULAR; SUBCUTANEOUS EVERY 5 MIN PRN
Status: CANCELLED | OUTPATIENT
Start: 2025-02-20

## 2025-02-20 RX ORDER — METHYLPREDNISOLONE SODIUM SUCCINATE 125 MG/2ML
125 INJECTION INTRAMUSCULAR; INTRAVENOUS ONCE
Status: CANCELLED | OUTPATIENT
Start: 2025-02-20

## 2025-02-20 RX ORDER — METHYLPREDNISOLONE SODIUM SUCCINATE 40 MG/ML
40 INJECTION INTRAMUSCULAR; INTRAVENOUS
Status: CANCELLED
Start: 2025-02-20

## 2025-02-20 RX ORDER — DIPHENHYDRAMINE HYDROCHLORIDE 50 MG/ML
25 INJECTION INTRAMUSCULAR; INTRAVENOUS
Status: DISCONTINUED | OUTPATIENT
Start: 2025-02-20 | End: 2025-02-20 | Stop reason: HOSPADM

## 2025-02-20 RX ORDER — METHYLPREDNISOLONE SODIUM SUCCINATE 125 MG/2ML
125 INJECTION INTRAMUSCULAR; INTRAVENOUS ONCE
Status: COMPLETED | OUTPATIENT
Start: 2025-02-20 | End: 2025-02-20

## 2025-02-20 RX ORDER — HEPARIN SODIUM,PORCINE 10 UNIT/ML
5-20 VIAL (ML) INTRAVENOUS DAILY PRN
OUTPATIENT
Start: 2025-02-20

## 2025-02-20 RX ORDER — METHYLPREDNISOLONE SODIUM SUCCINATE 40 MG/ML
40 INJECTION INTRAMUSCULAR; INTRAVENOUS
Status: DISCONTINUED | OUTPATIENT
Start: 2025-02-20 | End: 2025-02-20 | Stop reason: HOSPADM

## 2025-02-20 RX ORDER — ALBUTEROL SULFATE 0.83 MG/ML
2.5 SOLUTION RESPIRATORY (INHALATION)
Status: DISCONTINUED | OUTPATIENT
Start: 2025-02-20 | End: 2025-02-20 | Stop reason: HOSPADM

## 2025-02-20 RX ORDER — ALBUTEROL SULFATE 0.83 MG/ML
2.5 SOLUTION RESPIRATORY (INHALATION)
Status: CANCELLED | OUTPATIENT
Start: 2025-02-20

## 2025-02-20 RX ORDER — DIPHENHYDRAMINE HYDROCHLORIDE 50 MG/ML
50 INJECTION INTRAMUSCULAR; INTRAVENOUS
Status: DISCONTINUED | OUTPATIENT
Start: 2025-02-20 | End: 2025-02-20 | Stop reason: HOSPADM

## 2025-02-20 RX ORDER — HEPARIN SODIUM (PORCINE) LOCK FLUSH IV SOLN 100 UNIT/ML 100 UNIT/ML
5 SOLUTION INTRAVENOUS
OUTPATIENT
Start: 2025-02-20

## 2025-02-20 RX ORDER — ACETAMINOPHEN 325 MG/1
650 TABLET ORAL ONCE
Status: COMPLETED | OUTPATIENT
Start: 2025-02-20 | End: 2025-02-20

## 2025-02-20 RX ORDER — ACETAMINOPHEN 325 MG/1
650 TABLET ORAL ONCE
Status: CANCELLED
Start: 2025-02-20

## 2025-02-20 RX ORDER — DIPHENHYDRAMINE HYDROCHLORIDE 50 MG/ML
50 INJECTION INTRAMUSCULAR; INTRAVENOUS ONCE
Status: COMPLETED | OUTPATIENT
Start: 2025-02-20 | End: 2025-02-20

## 2025-02-20 RX ORDER — DIPHENHYDRAMINE HYDROCHLORIDE 50 MG/ML
25 INJECTION INTRAMUSCULAR; INTRAVENOUS
Status: CANCELLED
Start: 2025-02-20

## 2025-02-20 RX ORDER — ALBUTEROL SULFATE 90 UG/1
1-2 INHALANT RESPIRATORY (INHALATION)
Status: CANCELLED
Start: 2025-02-20

## 2025-02-20 RX ORDER — DIPHENHYDRAMINE HYDROCHLORIDE 50 MG/ML
50 INJECTION INTRAMUSCULAR; INTRAVENOUS
Status: CANCELLED
Start: 2025-02-20

## 2025-02-20 RX ORDER — ALBUTEROL SULFATE 90 UG/1
1-2 INHALANT RESPIRATORY (INHALATION)
Status: DISCONTINUED | OUTPATIENT
Start: 2025-02-20 | End: 2025-02-20 | Stop reason: HOSPADM

## 2025-02-20 RX ORDER — DIPHENHYDRAMINE HCL 50 MG
50 CAPSULE ORAL ONCE
Start: 2025-02-20

## 2025-02-20 RX ORDER — EPINEPHRINE 1 MG/ML
0.3 INJECTION, SOLUTION INTRAMUSCULAR; SUBCUTANEOUS EVERY 5 MIN PRN
Status: DISCONTINUED | OUTPATIENT
Start: 2025-02-20 | End: 2025-02-20 | Stop reason: HOSPADM

## 2025-02-20 RX ADMIN — DIPHENHYDRAMINE HYDROCHLORIDE 50 MG: 50 INJECTION, SOLUTION INTRAMUSCULAR; INTRAVENOUS at 09:17

## 2025-02-20 RX ADMIN — METHYLPREDNISOLONE SODIUM SUCCINATE 125 MG: 125 INJECTION, POWDER, FOR SOLUTION INTRAMUSCULAR; INTRAVENOUS at 09:15

## 2025-02-20 RX ADMIN — SODIUM CHLORIDE 250 ML: 9 INJECTION, SOLUTION INTRAVENOUS at 09:18

## 2025-02-20 RX ADMIN — ACETAMINOPHEN 650 MG: 325 TABLET ORAL at 09:14

## 2025-02-20 RX ADMIN — SODIUM CHLORIDE 500 MG: 9 INJECTION, SOLUTION INTRAVENOUS at 09:48

## 2025-02-20 NOTE — PROGRESS NOTES
~~~ NOTE: If the patient answers yes to any of the questions below, hold the infusion and contact ordering provider or on-call provider.    Do you currently have any signs of illness or infection or are you on any antibiotics? No  Have you recently had an elevated temperature, fever, chills, productive cough, coughing for 3 weeks or longer or hemoptysis, abnormal vital signs, night sweats, chest pain or have you noticed a decrease in your appetite, unexplained weight loss or fatigue? No  Have you had any new, sudden, or worsening abdominal pain? No  Do you have any open wounds or new incisions? (exclude for patients with hidradenitis suppurativa) No  Have you recently been diagnosed with any new nervous system diseases (ie. Multiple sclerosis, Guillain [place], seizures, neurological changes) or cancer diagnosis? Are you on any form of radiation or chemotherapy? No  Have there been any other new onset medical symptoms? No  Are you pregnant or breast feeding or do you have plans of pregnancy in the future? No; N/A  Do you have any upcoming hospitalizations or surgeries? Does not include esophagogastroduodenoscopy, colonoscopy, endoscopic retrograde cholangiopancreatography (ERCP), endoscopic ultrasound (EUS), dental procedures (including cleanings, fillings, implants, extractions)  or joint aspiration/steroid injections No  Have you or anyone in your household received a live vaccination in the past 4 weeks? Please note: No live vaccines while on biologic/chemotherapy until 6 months after the last treatment. Patient can receive the flu vaccine (shot only).  It is optimal for the patient to get it mid cycle, but it can be given at any time as long as it is not on the day of the infusion. No  If applicable to prescribed medication, confirm negative PPD or quantiferon gold MTB. If positive, verify has negative chest x-ray or the patient is at least 4 weeks post initiation of INH/B6 therapy and have clearance from provider  before infusion (Y/N:920708)  If applicable to prescribed medication, confirm negative hepatitis B surface antigen or hepatitis C. If positive, clearance from provider before infusion. (Y/N: 477624)  Rheumatology patients receiving tocilizumab (Actemra): If labs were drawn within the past week, hold dosing until cleared to infuse If AST/ALT > 2 X upper limit normal; ANC < 1.0. NO; N/A  Patients receiving belimumab (Benlysta): Have you been having any signs of worsening depression or suicidal ideations? No; N/A   Infusion Nursing Note:  Compakenna Pennington presents today for Rituxan second infusion, pt first was in april.    Patient seen by provider today: No   present during visit today: Not Applicable.    Note: Pt reacted to his last Rituxan in April with scratchy throat. Pt was given iv benadryl, po tylenol, and iv solumedrol 30 min prior to starting infusion.  Pt had no reaction today.    Intravenous Access:  Peripheral IV placed.    Treatment Conditions:  Not Applicable.      Post Infusion Assessment:  Patient tolerated infusion without incident.  Access discontinued per protocol.       Discharge Plan:   Patient and/or family verbalized understanding of discharge instructions and all questions answered.      Nadja Kent RN

## 2025-06-01 ENCOUNTER — HEALTH MAINTENANCE LETTER (OUTPATIENT)
Age: 45
End: 2025-06-01

## 2025-07-10 ENCOUNTER — OFFICE VISIT (OUTPATIENT)
Dept: FAMILY MEDICINE | Facility: CLINIC | Age: 45
End: 2025-07-10
Payer: COMMERCIAL

## 2025-07-10 VITALS
HEART RATE: 52 BPM | HEIGHT: 75 IN | SYSTOLIC BLOOD PRESSURE: 123 MMHG | OXYGEN SATURATION: 99 % | TEMPERATURE: 97.9 F | WEIGHT: 212.6 LBS | DIASTOLIC BLOOD PRESSURE: 85 MMHG | RESPIRATION RATE: 20 BRPM | BODY MASS INDEX: 26.43 KG/M2

## 2025-07-10 DIAGNOSIS — B07.9 VIRAL WARTS, UNSPECIFIED TYPE: ICD-10-CM

## 2025-07-10 DIAGNOSIS — J01.90 ACUTE SINUSITIS WITH SYMPTOMS > 10 DAYS: Primary | ICD-10-CM

## 2025-07-10 NOTE — PATIENT INSTRUCTIONS

## 2025-07-10 NOTE — PROGRESS NOTES
"  {PROVIDER CHARTING PREFERENCE:533986}    Chavo Chatman is a 44 year old, presenting for the following health issues:  Gland (Left parotid swelling/pain x 6 days ) and Sinus Problem (Sinus pain under eyes, and nasal congestion x 2 weeks)        7/10/2025     1:47 PM   Additional Questions   Roomed by Xochilt BATRES LPN     Sinus Problem         Starting 6 days ago had a \"growth\" in his cheek  Has better since onset  Inside the cheek on the left    Bilateral cheeks  Not a lot of nasal drainage  Feels plugged, some PND  No jaw pain  No fever    {MA/LPN/RN Pre-Provider Visit Orders- hCG/UA/Strep (Optional):703179}  {SUPERLIST (Optional):726471}  {additonal problems for provider to add (Optional):399435}    {ROS Picklists (Optional):881551}      Objective    /85   Pulse 52   Temp 97.9  F (36.6  C) (Oral)   Resp 20   Ht 1.905 m (6' 3\")   Wt 96.4 kg (212 lb 9.6 oz)   SpO2 99%   BMI 26.57 kg/m    Body mass index is 26.57 kg/m .  Physical Exam   {Exam List (Optional):058832}    {Diagnostic Test Results (Optional):003217}        Signed Electronically by: Marsha Gabriel MD  {Email feedback regarding this note to [email or web address]   :039720}  "

## 2025-08-12 ENCOUNTER — OFFICE VISIT (OUTPATIENT)
Dept: NEUROLOGY | Facility: CLINIC | Age: 45
End: 2025-08-12
Attending: PSYCHIATRY & NEUROLOGY
Payer: COMMERCIAL

## 2025-08-12 VITALS
WEIGHT: 213.5 LBS | BODY MASS INDEX: 26.55 KG/M2 | HEART RATE: 58 BPM | HEIGHT: 75 IN | SYSTOLIC BLOOD PRESSURE: 126 MMHG | OXYGEN SATURATION: 96 % | DIASTOLIC BLOOD PRESSURE: 87 MMHG

## 2025-08-12 DIAGNOSIS — G37.81 MOG ANTIBODY DISEASE (H): Primary | ICD-10-CM

## 2025-08-12 PROCEDURE — G2211 COMPLEX E/M VISIT ADD ON: HCPCS | Performed by: PSYCHIATRY & NEUROLOGY

## 2025-08-12 PROCEDURE — 99214 OFFICE O/P EST MOD 30 MIN: CPT | Performed by: PSYCHIATRY & NEUROLOGY

## 2025-08-12 ASSESSMENT — PAIN SCALES - GENERAL: PAINLEVEL_OUTOF10: NO PAIN (0)

## 2025-08-18 ENCOUNTER — TRANSFERRED RECORDS (OUTPATIENT)
Dept: HEALTH INFORMATION MANAGEMENT | Facility: CLINIC | Age: 45
End: 2025-08-18
Payer: COMMERCIAL